# Patient Record
Sex: FEMALE | Race: WHITE | NOT HISPANIC OR LATINO
[De-identification: names, ages, dates, MRNs, and addresses within clinical notes are randomized per-mention and may not be internally consistent; named-entity substitution may affect disease eponyms.]

---

## 2017-10-27 ENCOUNTER — TRANSCRIPTION ENCOUNTER (OUTPATIENT)
Age: 32
End: 2017-10-27

## 2020-09-29 ENCOUNTER — APPOINTMENT (OUTPATIENT)
Dept: ANTEPARTUM | Facility: CLINIC | Age: 35
End: 2020-09-29
Payer: COMMERCIAL

## 2020-09-29 ENCOUNTER — LABORATORY RESULT (OUTPATIENT)
Age: 35
End: 2020-09-29

## 2020-09-29 PROCEDURE — 76801 OB US < 14 WKS SINGLE FETUS: CPT

## 2020-09-29 PROCEDURE — 76813 OB US NUCHAL MEAS 1 GEST: CPT

## 2020-10-27 ENCOUNTER — APPOINTMENT (OUTPATIENT)
Dept: ANTEPARTUM | Facility: CLINIC | Age: 35
End: 2020-10-27
Payer: COMMERCIAL

## 2020-10-27 PROCEDURE — 99213 OFFICE O/P EST LOW 20 MIN: CPT

## 2020-10-27 PROCEDURE — 99072 ADDL SUPL MATRL&STAF TM PHE: CPT

## 2020-10-27 PROCEDURE — 76811 OB US DETAILED SNGL FETUS: CPT

## 2020-10-27 PROCEDURE — 76817 TRANSVAGINAL US OBSTETRIC: CPT

## 2020-12-01 ENCOUNTER — APPOINTMENT (OUTPATIENT)
Dept: ANTEPARTUM | Facility: CLINIC | Age: 35
End: 2020-12-01
Payer: COMMERCIAL

## 2020-12-01 PROCEDURE — 76816 OB US FOLLOW-UP PER FETUS: CPT

## 2020-12-01 PROCEDURE — 76819 FETAL BIOPHYS PROFIL W/O NST: CPT

## 2020-12-29 ENCOUNTER — APPOINTMENT (OUTPATIENT)
Dept: ANTEPARTUM | Facility: CLINIC | Age: 35
End: 2020-12-29
Payer: COMMERCIAL

## 2020-12-29 PROCEDURE — 99072 ADDL SUPL MATRL&STAF TM PHE: CPT

## 2020-12-29 PROCEDURE — 76819 FETAL BIOPHYS PROFIL W/O NST: CPT

## 2020-12-29 PROCEDURE — 76816 OB US FOLLOW-UP PER FETUS: CPT

## 2021-01-26 ENCOUNTER — APPOINTMENT (OUTPATIENT)
Dept: ANTEPARTUM | Facility: CLINIC | Age: 36
End: 2021-01-26
Payer: COMMERCIAL

## 2021-01-26 PROCEDURE — 76816 OB US FOLLOW-UP PER FETUS: CPT

## 2021-01-26 PROCEDURE — 99072 ADDL SUPL MATRL&STAF TM PHE: CPT

## 2021-01-26 PROCEDURE — 76819 FETAL BIOPHYS PROFIL W/O NST: CPT

## 2021-03-02 ENCOUNTER — ASOB RESULT (OUTPATIENT)
Age: 36
End: 2021-03-02

## 2021-03-02 ENCOUNTER — APPOINTMENT (OUTPATIENT)
Dept: ANTEPARTUM | Facility: CLINIC | Age: 36
End: 2021-03-02
Payer: COMMERCIAL

## 2021-03-02 PROCEDURE — 76819 FETAL BIOPHYS PROFIL W/O NST: CPT

## 2021-03-02 PROCEDURE — 76816 OB US FOLLOW-UP PER FETUS: CPT

## 2021-03-02 PROCEDURE — 99072 ADDL SUPL MATRL&STAF TM PHE: CPT

## 2021-03-16 ENCOUNTER — ASOB RESULT (OUTPATIENT)
Age: 36
End: 2021-03-16

## 2021-03-16 ENCOUNTER — APPOINTMENT (OUTPATIENT)
Dept: ANTEPARTUM | Facility: CLINIC | Age: 36
End: 2021-03-16
Payer: COMMERCIAL

## 2021-03-16 PROCEDURE — 99072 ADDL SUPL MATRL&STAF TM PHE: CPT

## 2021-03-16 PROCEDURE — 76816 OB US FOLLOW-UP PER FETUS: CPT

## 2021-03-16 PROCEDURE — 76819 FETAL BIOPHYS PROFIL W/O NST: CPT

## 2021-04-08 ENCOUNTER — INPATIENT (INPATIENT)
Facility: HOSPITAL | Age: 36
LOS: 0 days | Discharge: HOME | End: 2021-04-09
Attending: OBSTETRICS & GYNECOLOGY | Admitting: OBSTETRICS & GYNECOLOGY
Payer: COMMERCIAL

## 2021-04-08 ENCOUNTER — RESULT REVIEW (OUTPATIENT)
Age: 36
End: 2021-04-08

## 2021-04-08 VITALS — WEIGHT: 158.73 LBS | HEIGHT: 62 IN

## 2021-04-08 LAB
ALBUMIN SERPL ELPH-MCNC: 3.7 G/DL — SIGNIFICANT CHANGE UP (ref 3.3–5)
ALP SERPL-CCNC: 149 U/L — HIGH (ref 40–120)
ALT FLD-CCNC: 11 U/L — SIGNIFICANT CHANGE UP (ref 10–45)
ANION GAP SERPL CALC-SCNC: 12 MMOL/L — SIGNIFICANT CHANGE UP (ref 5–17)
APTT BLD: 26.4 SEC — LOW (ref 27.5–35.5)
AST SERPL-CCNC: 20 U/L — SIGNIFICANT CHANGE UP (ref 10–40)
BASOPHILS # BLD AUTO: 0.07 K/UL — SIGNIFICANT CHANGE UP (ref 0–0.2)
BASOPHILS NFR BLD AUTO: 0.8 % — SIGNIFICANT CHANGE UP (ref 0–2)
BILIRUB SERPL-MCNC: 0.2 MG/DL — SIGNIFICANT CHANGE UP (ref 0.2–1.2)
BLD GP AB SCN SERPL QL: NEGATIVE — SIGNIFICANT CHANGE UP
BUN SERPL-MCNC: 10 MG/DL — SIGNIFICANT CHANGE UP (ref 7–23)
CALCIUM SERPL-MCNC: 9.3 MG/DL — SIGNIFICANT CHANGE UP (ref 8.4–10.5)
CHLORIDE SERPL-SCNC: 105 MMOL/L — SIGNIFICANT CHANGE UP (ref 96–108)
CO2 SERPL-SCNC: 21 MMOL/L — LOW (ref 22–31)
COVID-19 SPIKE DOMAIN AB INTERP: POSITIVE
COVID-19 SPIKE DOMAIN ANTIBODY RESULT: >250 U/ML — HIGH
CREAT ?TM UR-MCNC: 110 MG/DL — SIGNIFICANT CHANGE UP
CREAT SERPL-MCNC: 0.73 MG/DL — SIGNIFICANT CHANGE UP (ref 0.5–1.3)
EOSINOPHIL # BLD AUTO: 0.11 K/UL — SIGNIFICANT CHANGE UP (ref 0–0.5)
EOSINOPHIL NFR BLD AUTO: 1.2 % — SIGNIFICANT CHANGE UP (ref 0–6)
FIBRINOGEN PPP-MCNC: 391 MG/DL — SIGNIFICANT CHANGE UP (ref 258–438)
GLUCOSE SERPL-MCNC: 81 MG/DL — SIGNIFICANT CHANGE UP (ref 70–99)
HCT VFR BLD CALC: 33.9 % — LOW (ref 34.5–45)
HGB BLD-MCNC: 11.8 G/DL — SIGNIFICANT CHANGE UP (ref 11.5–15.5)
IMM GRANULOCYTES NFR BLD AUTO: 0.2 % — SIGNIFICANT CHANGE UP (ref 0–1.5)
INR BLD: 0.87 — LOW (ref 0.88–1.16)
LDH SERPL L TO P-CCNC: 152 U/L — SIGNIFICANT CHANGE UP (ref 50–242)
LYMPHOCYTES # BLD AUTO: 1.91 K/UL — SIGNIFICANT CHANGE UP (ref 1–3.3)
LYMPHOCYTES # BLD AUTO: 20.6 % — SIGNIFICANT CHANGE UP (ref 13–44)
MCHC RBC-ENTMCNC: 31.6 PG — SIGNIFICANT CHANGE UP (ref 27–34)
MCHC RBC-ENTMCNC: 34.8 GM/DL — SIGNIFICANT CHANGE UP (ref 32–36)
MCV RBC AUTO: 90.9 FL — SIGNIFICANT CHANGE UP (ref 80–100)
MONOCYTES # BLD AUTO: 0.76 K/UL — SIGNIFICANT CHANGE UP (ref 0–0.9)
MONOCYTES NFR BLD AUTO: 8.2 % — SIGNIFICANT CHANGE UP (ref 2–14)
NEUTROPHILS # BLD AUTO: 6.42 K/UL — SIGNIFICANT CHANGE UP (ref 1.8–7.4)
NEUTROPHILS NFR BLD AUTO: 69 % — SIGNIFICANT CHANGE UP (ref 43–77)
NRBC # BLD: 0 /100 WBCS — SIGNIFICANT CHANGE UP (ref 0–0)
PLATELET # BLD AUTO: 243 K/UL — SIGNIFICANT CHANGE UP (ref 150–400)
POTASSIUM SERPL-MCNC: 3.7 MMOL/L — SIGNIFICANT CHANGE UP (ref 3.5–5.3)
POTASSIUM SERPL-SCNC: 3.7 MMOL/L — SIGNIFICANT CHANGE UP (ref 3.5–5.3)
PROT ?TM UR-MCNC: 19 MG/DL — HIGH (ref 0–12)
PROT ?TM UR-MCNC: 19 MG/DL — HIGH (ref 0–12)
PROT SERPL-MCNC: 6.5 G/DL — SIGNIFICANT CHANGE UP (ref 6–8.3)
PROT/CREAT UR-RTO: 0.2 RATIO — SIGNIFICANT CHANGE UP (ref 0–0.2)
PROTHROM AB SERPL-ACNC: 10.5 SEC — LOW (ref 10.6–13.6)
RBC # BLD: 3.73 M/UL — LOW (ref 3.8–5.2)
RBC # FLD: 12.1 % — SIGNIFICANT CHANGE UP (ref 10.3–14.5)
RH IG SCN BLD-IMP: POSITIVE — SIGNIFICANT CHANGE UP
RH IG SCN BLD-IMP: POSITIVE — SIGNIFICANT CHANGE UP
SARS-COV-2 IGG+IGM SERPL QL IA: >250 U/ML — HIGH
SARS-COV-2 IGG+IGM SERPL QL IA: POSITIVE
SARS-COV-2 RNA SPEC QL NAA+PROBE: SIGNIFICANT CHANGE UP
SODIUM SERPL-SCNC: 138 MMOL/L — SIGNIFICANT CHANGE UP (ref 135–145)
T PALLIDUM AB TITR SER: NEGATIVE — SIGNIFICANT CHANGE UP
URATE SERPL-MCNC: 4.9 MG/DL — SIGNIFICANT CHANGE UP (ref 2.5–7)
WBC # BLD: 9.29 K/UL — SIGNIFICANT CHANGE UP (ref 3.8–10.5)
WBC # FLD AUTO: 9.29 K/UL — SIGNIFICANT CHANGE UP (ref 3.8–10.5)

## 2021-04-08 PROCEDURE — 88307 TISSUE EXAM BY PATHOLOGIST: CPT | Mod: 26

## 2021-04-08 RX ORDER — DIBUCAINE 1 %
1 OINTMENT (GRAM) RECTAL EVERY 6 HOURS
Refills: 0 | Status: DISCONTINUED | OUTPATIENT
Start: 2021-04-08 | End: 2021-04-09

## 2021-04-08 RX ORDER — DIPHENHYDRAMINE HCL 50 MG
25 CAPSULE ORAL EVERY 6 HOURS
Refills: 0 | Status: DISCONTINUED | OUTPATIENT
Start: 2021-04-08 | End: 2021-04-09

## 2021-04-08 RX ORDER — SODIUM CHLORIDE 9 MG/ML
1000 INJECTION, SOLUTION INTRAVENOUS
Refills: 0 | Status: DISCONTINUED | OUTPATIENT
Start: 2021-04-08 | End: 2021-04-08

## 2021-04-08 RX ORDER — HYDROCORTISONE 1 %
1 OINTMENT (GRAM) TOPICAL EVERY 6 HOURS
Refills: 0 | Status: DISCONTINUED | OUTPATIENT
Start: 2021-04-08 | End: 2021-04-09

## 2021-04-08 RX ORDER — SIMETHICONE 80 MG/1
80 TABLET, CHEWABLE ORAL EVERY 4 HOURS
Refills: 0 | Status: DISCONTINUED | OUTPATIENT
Start: 2021-04-08 | End: 2021-04-09

## 2021-04-08 RX ORDER — IBUPROFEN 200 MG
600 TABLET ORAL EVERY 6 HOURS
Refills: 0 | Status: COMPLETED | OUTPATIENT
Start: 2021-04-08 | End: 2022-03-07

## 2021-04-08 RX ORDER — KETOROLAC TROMETHAMINE 30 MG/ML
30 SYRINGE (ML) INJECTION ONCE
Refills: 0 | Status: DISCONTINUED | OUTPATIENT
Start: 2021-04-08 | End: 2021-04-08

## 2021-04-08 RX ORDER — MAGNESIUM HYDROXIDE 400 MG/1
30 TABLET, CHEWABLE ORAL
Refills: 0 | Status: DISCONTINUED | OUTPATIENT
Start: 2021-04-08 | End: 2021-04-09

## 2021-04-08 RX ORDER — OXYCODONE HYDROCHLORIDE 5 MG/1
5 TABLET ORAL
Refills: 0 | Status: DISCONTINUED | OUTPATIENT
Start: 2021-04-08 | End: 2021-04-09

## 2021-04-08 RX ORDER — TETANUS TOXOID, REDUCED DIPHTHERIA TOXOID AND ACELLULAR PERTUSSIS VACCINE, ADSORBED 5; 2.5; 8; 8; 2.5 [IU]/.5ML; [IU]/.5ML; UG/.5ML; UG/.5ML; UG/.5ML
0.5 SUSPENSION INTRAMUSCULAR ONCE
Refills: 0 | Status: DISCONTINUED | OUTPATIENT
Start: 2021-04-08 | End: 2021-04-09

## 2021-04-08 RX ORDER — ACETAMINOPHEN 500 MG
975 TABLET ORAL
Refills: 0 | Status: DISCONTINUED | OUTPATIENT
Start: 2021-04-08 | End: 2021-04-09

## 2021-04-08 RX ORDER — PRAMOXINE HYDROCHLORIDE 150 MG/15G
1 AEROSOL, FOAM RECTAL EVERY 4 HOURS
Refills: 0 | Status: DISCONTINUED | OUTPATIENT
Start: 2021-04-08 | End: 2021-04-09

## 2021-04-08 RX ORDER — LANOLIN
1 OINTMENT (GRAM) TOPICAL EVERY 6 HOURS
Refills: 0 | Status: DISCONTINUED | OUTPATIENT
Start: 2021-04-08 | End: 2021-04-09

## 2021-04-08 RX ORDER — FENTANYL/BUPIVACAINE/NS/PF 2MCG/ML-.1
250 PLASTIC BAG, INJECTION (ML) INJECTION
Refills: 0 | Status: DISCONTINUED | OUTPATIENT
Start: 2021-04-08 | End: 2021-04-08

## 2021-04-08 RX ORDER — OXYCODONE HYDROCHLORIDE 5 MG/1
5 TABLET ORAL ONCE
Refills: 0 | Status: DISCONTINUED | OUTPATIENT
Start: 2021-04-08 | End: 2021-04-09

## 2021-04-08 RX ORDER — CITRIC ACID/SODIUM CITRATE 300-500 MG
15 SOLUTION, ORAL ORAL EVERY 6 HOURS
Refills: 0 | Status: DISCONTINUED | OUTPATIENT
Start: 2021-04-08 | End: 2021-04-08

## 2021-04-08 RX ORDER — AER TRAVELER 0.5 G/1
1 SOLUTION RECTAL; TOPICAL EVERY 4 HOURS
Refills: 0 | Status: DISCONTINUED | OUTPATIENT
Start: 2021-04-08 | End: 2021-04-09

## 2021-04-08 RX ORDER — SODIUM CHLORIDE 9 MG/ML
3 INJECTION INTRAMUSCULAR; INTRAVENOUS; SUBCUTANEOUS EVERY 8 HOURS
Refills: 0 | Status: DISCONTINUED | OUTPATIENT
Start: 2021-04-08 | End: 2021-04-09

## 2021-04-08 RX ORDER — OXYTOCIN 10 UNIT/ML
333.33 VIAL (ML) INJECTION
Qty: 20 | Refills: 0 | Status: DISCONTINUED | OUTPATIENT
Start: 2021-04-08 | End: 2021-04-08

## 2021-04-08 RX ORDER — BENZOCAINE 10 %
1 GEL (GRAM) MUCOUS MEMBRANE EVERY 6 HOURS
Refills: 0 | Status: DISCONTINUED | OUTPATIENT
Start: 2021-04-08 | End: 2021-04-09

## 2021-04-08 RX ORDER — IBUPROFEN 200 MG
600 TABLET ORAL EVERY 6 HOURS
Refills: 0 | Status: DISCONTINUED | OUTPATIENT
Start: 2021-04-08 | End: 2021-04-09

## 2021-04-08 RX ORDER — OXYTOCIN 10 UNIT/ML
333.33 VIAL (ML) INJECTION
Qty: 20 | Refills: 0 | Status: DISCONTINUED | OUTPATIENT
Start: 2021-04-08 | End: 2021-04-09

## 2021-04-08 RX ADMIN — Medication 975 MILLIGRAM(S): at 21:10

## 2021-04-08 RX ADMIN — Medication 600 MILLIGRAM(S): at 19:26

## 2021-04-08 RX ADMIN — Medication 975 MILLIGRAM(S): at 20:33

## 2021-04-08 RX ADMIN — Medication 600 MILLIGRAM(S): at 18:38

## 2021-04-08 RX ADMIN — SODIUM CHLORIDE 125 MILLILITER(S): 9 INJECTION, SOLUTION INTRAVENOUS at 08:04

## 2021-04-08 RX ADMIN — SODIUM CHLORIDE 125 MILLILITER(S): 9 INJECTION, SOLUTION INTRAVENOUS at 08:24

## 2021-04-08 RX ADMIN — Medication 975 MILLIGRAM(S): at 14:26

## 2021-04-08 RX ADMIN — Medication 1000 MILLIUNIT(S)/MIN: at 14:31

## 2021-04-08 RX ADMIN — Medication 250 MILLILITER(S): at 11:20

## 2021-04-09 ENCOUNTER — TRANSCRIPTION ENCOUNTER (OUTPATIENT)
Age: 36
End: 2021-04-09

## 2021-04-09 VITALS
DIASTOLIC BLOOD PRESSURE: 66 MMHG | OXYGEN SATURATION: 98 % | TEMPERATURE: 98 F | SYSTOLIC BLOOD PRESSURE: 101 MMHG | RESPIRATION RATE: 17 BRPM | HEART RATE: 77 BPM

## 2021-04-09 PROBLEM — Z00.00 ENCOUNTER FOR PREVENTIVE HEALTH EXAMINATION: Status: ACTIVE | Noted: 2021-04-09

## 2021-04-09 PROCEDURE — 84550 ASSAY OF BLOOD/URIC ACID: CPT

## 2021-04-09 PROCEDURE — 85730 THROMBOPLASTIN TIME PARTIAL: CPT

## 2021-04-09 PROCEDURE — U0003: CPT

## 2021-04-09 PROCEDURE — 88307 TISSUE EXAM BY PATHOLOGIST: CPT

## 2021-04-09 PROCEDURE — 86901 BLOOD TYPING SEROLOGIC RH(D): CPT

## 2021-04-09 PROCEDURE — 82570 ASSAY OF URINE CREATININE: CPT

## 2021-04-09 PROCEDURE — 59050 FETAL MONITOR W/REPORT: CPT

## 2021-04-09 PROCEDURE — 85025 COMPLETE CBC W/AUTO DIFF WBC: CPT

## 2021-04-09 PROCEDURE — 86780 TREPONEMA PALLIDUM: CPT

## 2021-04-09 PROCEDURE — 84156 ASSAY OF PROTEIN URINE: CPT

## 2021-04-09 PROCEDURE — 86769 SARS-COV-2 COVID-19 ANTIBODY: CPT

## 2021-04-09 PROCEDURE — 85610 PROTHROMBIN TIME: CPT

## 2021-04-09 PROCEDURE — 80053 COMPREHEN METABOLIC PANEL: CPT

## 2021-04-09 PROCEDURE — 83615 LACTATE (LD) (LDH) ENZYME: CPT

## 2021-04-09 PROCEDURE — 86850 RBC ANTIBODY SCREEN: CPT

## 2021-04-09 PROCEDURE — 85384 FIBRINOGEN ACTIVITY: CPT

## 2021-04-09 PROCEDURE — U0005: CPT

## 2021-04-09 PROCEDURE — 86900 BLOOD TYPING SEROLOGIC ABO: CPT

## 2021-04-09 RX ORDER — BUPROPION HYDROCHLORIDE 150 MG/1
150 TABLET, EXTENDED RELEASE ORAL DAILY
Refills: 0 | Status: DISCONTINUED | OUTPATIENT
Start: 2021-04-09 | End: 2021-04-09

## 2021-04-09 RX ADMIN — Medication 600 MILLIGRAM(S): at 07:30

## 2021-04-09 RX ADMIN — Medication 975 MILLIGRAM(S): at 04:08

## 2021-04-09 RX ADMIN — Medication 600 MILLIGRAM(S): at 00:46

## 2021-04-09 RX ADMIN — Medication 600 MILLIGRAM(S): at 18:43

## 2021-04-09 RX ADMIN — Medication 975 MILLIGRAM(S): at 15:49

## 2021-04-09 RX ADMIN — Medication 975 MILLIGRAM(S): at 08:46

## 2021-04-09 RX ADMIN — Medication 600 MILLIGRAM(S): at 01:30

## 2021-04-09 RX ADMIN — Medication 600 MILLIGRAM(S): at 13:00

## 2021-04-09 RX ADMIN — Medication 600 MILLIGRAM(S): at 11:50

## 2021-04-09 RX ADMIN — Medication 1 TABLET(S): at 11:51

## 2021-04-09 RX ADMIN — Medication 975 MILLIGRAM(S): at 04:50

## 2021-04-09 RX ADMIN — Medication 600 MILLIGRAM(S): at 06:58

## 2021-04-09 RX ADMIN — BUPROPION HYDROCHLORIDE 150 MILLIGRAM(S): 150 TABLET, EXTENDED RELEASE ORAL at 07:49

## 2021-04-09 RX ADMIN — Medication 975 MILLIGRAM(S): at 09:58

## 2021-04-09 NOTE — DISCHARGE NOTE OB - CARE PLAN
Principal Discharge DX:	Vaginal delivery  Goal:	feel well!  Assessment and plan of treatment:	Vaginal delivery, meeting all postpartum milestones.  Follow up in 6 weeks.

## 2021-04-09 NOTE — PROGRESS NOTE ADULT - ASSESSMENT
Assessment/Plan    35y y.o. F now PPD#1 s/p . AfVSS. Patient is progressing toward all postpartum milestones. Pain is well-controlled on PO medications. Anticipate discharge today or tomorrow.    1. Pain  - Well-controlled on Motrin and Tylenol ATC    2. GI  - Tolerating regular diet without n/v  - Senna PRN    3.   - Voiding spontaneously without difficulty    4. DVT prophylaxis  - Encouraging ambulation    5. Dispo  - Anticipate dispo PPD#1        Bella Aldrich MD PGY1

## 2021-04-09 NOTE — PROGRESS NOTE ADULT - SUBJECTIVE AND OBJECTIVE BOX
Patient is a 35y y.o. F now PPD #1 s/p .    NAEO. Patient was evaluated at bedside this AM. Pain is well-controlled with PO pain medications. She has been ambulating without difficulty and voiding spontaneously. She is unsure if her vaginal bleeding has decreased.    Vital Signs Last 24 Hrs  T(C): 36.4 (2021 06:00), Max: 36.8 (2021 17:25)  T(F): 97.6 (2021 06:00), Max: 98.2 (2021 17:25)  HR: 78 (2021 06:00) (75 - 106)  BP: 106/67 (2021 06:00) (95/58 - 132/77)  BP(mean): 80 (2021 06:00) (70 - 80)  RR: 18 (2021 06:00) (17 - 20)  SpO2: 98% (2021 06:00) (97% - 98%)    Physical Exam  Gen: Well-appearing. No acute distress. Resting comfortably in bed.  Resp: Breathing comfortably on RA.  Abd: Soft, non-tender, non-distended. Uterus firm at umbilicus.  : Lochia WNL  Extremities: No calf tenderness.    Labs                          11.8   9.29  )-----------( 243      ( 2021 08:14 )             33.9         138  |  105  |  10  ----------------------------<  81  3.7   |  21<L>  |  0.73    Ca    9.3      2021 08:14    TPro  6.5  /  Alb  3.7  /  TBili  0.2  /  DBili  x   /  AST  20  /  ALT  11  /  AlkPhos  149<H>      PT/INR - ( 2021 08:17 )   PT: 10.5 sec;   INR: 0.87          PTT - ( 2021 08:17 )  PTT:26.4 sec        21 @ 07:01  -  21 @ 07:00  --------------------------------------------------------  IN: 0 mL / OUT: 1470 mL / NET: -1470 mL

## 2021-04-09 NOTE — DISCHARGE NOTE OB - CARE PROVIDER_API CALL
Jimena Morrison (DO; MS)  [facility]  [facility] [place] [address]  Phone: [phone]  Fax: [phone]  Follow Up Time:

## 2021-04-09 NOTE — DISCHARGE NOTE OB - PATIENT PORTAL LINK FT
You can access the FollowMyHealth Patient Portal offered by [facility] by registering at the following website: [email or web address]. By joining [facility]’s FollowMyHealth portal, you will also be able to view your health information using other applications (apps) compatible with our system.

## 2021-04-13 LAB — SURGICAL PATHOLOGY STUDY: SIGNIFICANT CHANGE UP

## 2021-04-14 DIAGNOSIS — Z3A.39 39 WEEKS GESTATION OF PREGNANCY: ICD-10-CM

## 2021-12-21 ENCOUNTER — TRANSCRIPTION ENCOUNTER (OUTPATIENT)
Age: 36
End: 2021-12-21

## 2021-12-21 ENCOUNTER — APPOINTMENT (OUTPATIENT)
Dept: ANTEPARTUM | Facility: CLINIC | Age: 36
End: 2021-12-21
Payer: COMMERCIAL

## 2021-12-21 ENCOUNTER — ASOB RESULT (OUTPATIENT)
Age: 36
End: 2021-12-21

## 2021-12-21 PROCEDURE — 76813 OB US NUCHAL MEAS 1 GEST: CPT

## 2021-12-21 PROCEDURE — 76801 OB US < 14 WKS SINGLE FETUS: CPT

## 2022-01-12 ENCOUNTER — APPOINTMENT (OUTPATIENT)
Dept: ANTEPARTUM | Facility: CLINIC | Age: 37
End: 2022-01-12
Payer: COMMERCIAL

## 2022-01-12 ENCOUNTER — ASOB RESULT (OUTPATIENT)
Age: 37
End: 2022-01-12

## 2022-01-12 PROCEDURE — 76811 OB US DETAILED SNGL FETUS: CPT

## 2022-01-12 PROCEDURE — 76817 TRANSVAGINAL US OBSTETRIC: CPT

## 2022-02-16 ENCOUNTER — APPOINTMENT (OUTPATIENT)
Dept: ANTEPARTUM | Facility: CLINIC | Age: 37
End: 2022-02-16
Payer: COMMERCIAL

## 2022-02-16 ENCOUNTER — ASOB RESULT (OUTPATIENT)
Age: 37
End: 2022-02-16

## 2022-02-16 PROCEDURE — 76816 OB US FOLLOW-UP PER FETUS: CPT

## 2022-02-18 ENCOUNTER — APPOINTMENT (OUTPATIENT)
Dept: PEDIATRIC CARDIOLOGY | Facility: CLINIC | Age: 37
End: 2022-02-18
Payer: COMMERCIAL

## 2022-02-18 PROCEDURE — 76825 ECHO EXAM OF FETAL HEART: CPT

## 2022-02-18 PROCEDURE — 76827 ECHO EXAM OF FETAL HEART: CPT

## 2022-02-18 PROCEDURE — 76820 UMBILICAL ARTERY ECHO: CPT

## 2022-02-18 PROCEDURE — 76821 MIDDLE CEREBRAL ARTERY ECHO: CPT

## 2022-02-18 PROCEDURE — 99203 OFFICE O/P NEW LOW 30 MIN: CPT

## 2022-02-18 PROCEDURE — 93325 DOPPLER ECHO COLOR FLOW MAPG: CPT | Mod: 59

## 2022-03-17 ENCOUNTER — ASOB RESULT (OUTPATIENT)
Age: 37
End: 2022-03-17

## 2022-03-17 ENCOUNTER — APPOINTMENT (OUTPATIENT)
Dept: ANTEPARTUM | Facility: CLINIC | Age: 37
End: 2022-03-17
Payer: COMMERCIAL

## 2022-03-17 PROCEDURE — 76815 OB US LIMITED FETUS(S): CPT

## 2022-03-25 ENCOUNTER — APPOINTMENT (OUTPATIENT)
Dept: PEDIATRIC CARDIOLOGY | Facility: CLINIC | Age: 37
End: 2022-03-25
Payer: COMMERCIAL

## 2022-03-25 PROCEDURE — 76826 ECHO EXAM OF FETAL HEART: CPT

## 2022-03-25 PROCEDURE — 99213 OFFICE O/P EST LOW 20 MIN: CPT

## 2022-03-25 PROCEDURE — 76821 MIDDLE CEREBRAL ARTERY ECHO: CPT

## 2022-03-25 PROCEDURE — 76828 ECHO EXAM OF FETAL HEART: CPT

## 2022-03-25 PROCEDURE — 76820 UMBILICAL ARTERY ECHO: CPT

## 2022-03-25 PROCEDURE — 93325 DOPPLER ECHO COLOR FLOW MAPG: CPT | Mod: 59

## 2022-04-28 ENCOUNTER — APPOINTMENT (OUTPATIENT)
Dept: ANTEPARTUM | Facility: CLINIC | Age: 37
End: 2022-04-28
Payer: COMMERCIAL

## 2022-04-28 ENCOUNTER — ASOB RESULT (OUTPATIENT)
Age: 37
End: 2022-04-28

## 2022-04-28 PROCEDURE — 76816 OB US FOLLOW-UP PER FETUS: CPT

## 2022-04-28 PROCEDURE — 76819 FETAL BIOPHYS PROFIL W/O NST: CPT

## 2022-05-06 ENCOUNTER — APPOINTMENT (OUTPATIENT)
Dept: PEDIATRIC CARDIOLOGY | Facility: CLINIC | Age: 37
End: 2022-05-06
Payer: COMMERCIAL

## 2022-05-06 PROCEDURE — 76820 UMBILICAL ARTERY ECHO: CPT

## 2022-05-06 PROCEDURE — 76826 ECHO EXAM OF FETAL HEART: CPT

## 2022-05-06 PROCEDURE — 99213 OFFICE O/P EST LOW 20 MIN: CPT

## 2022-05-06 PROCEDURE — 93325 DOPPLER ECHO COLOR FLOW MAPG: CPT | Mod: 59

## 2022-05-06 PROCEDURE — 76828 ECHO EXAM OF FETAL HEART: CPT

## 2022-05-06 PROCEDURE — 76821 MIDDLE CEREBRAL ARTERY ECHO: CPT

## 2022-06-02 ENCOUNTER — APPOINTMENT (OUTPATIENT)
Dept: ANTEPARTUM | Facility: CLINIC | Age: 37
End: 2022-06-02
Payer: COMMERCIAL

## 2022-06-02 ENCOUNTER — ASOB RESULT (OUTPATIENT)
Age: 37
End: 2022-06-02

## 2022-06-02 PROCEDURE — 76816 OB US FOLLOW-UP PER FETUS: CPT

## 2022-06-02 PROCEDURE — 76818 FETAL BIOPHYS PROFILE W/NST: CPT

## 2022-06-13 ENCOUNTER — ASOB RESULT (OUTPATIENT)
Age: 37
End: 2022-06-13

## 2022-06-13 ENCOUNTER — APPOINTMENT (OUTPATIENT)
Dept: ANTEPARTUM | Facility: CLINIC | Age: 37
End: 2022-06-13
Payer: COMMERCIAL

## 2022-06-13 PROCEDURE — 76819 FETAL BIOPHYS PROFIL W/O NST: CPT

## 2022-06-13 PROCEDURE — 76816 OB US FOLLOW-UP PER FETUS: CPT

## 2022-06-22 ENCOUNTER — INPATIENT (INPATIENT)
Facility: HOSPITAL | Age: 37
LOS: 1 days | Discharge: HOME | End: 2022-06-24
Attending: STUDENT IN AN ORGANIZED HEALTH CARE EDUCATION/TRAINING PROGRAM | Admitting: STUDENT IN AN ORGANIZED HEALTH CARE EDUCATION/TRAINING PROGRAM
Payer: COMMERCIAL

## 2022-06-22 ENCOUNTER — RESULT REVIEW (OUTPATIENT)
Age: 37
End: 2022-06-22

## 2022-06-22 VITALS — WEIGHT: 138.89 LBS | HEIGHT: 62 IN

## 2022-06-22 LAB
BASOPHILS # BLD AUTO: 0.05 K/UL — SIGNIFICANT CHANGE UP (ref 0–0.2)
BASOPHILS NFR BLD AUTO: 0.6 % — SIGNIFICANT CHANGE UP (ref 0–2)
BLD GP AB SCN SERPL QL: NEGATIVE — SIGNIFICANT CHANGE UP
EOSINOPHIL # BLD AUTO: 0.06 K/UL — SIGNIFICANT CHANGE UP (ref 0–0.5)
EOSINOPHIL NFR BLD AUTO: 0.8 % — SIGNIFICANT CHANGE UP (ref 0–6)
HCT VFR BLD CALC: 30.5 % — LOW (ref 34.5–45)
HGB BLD-MCNC: 10.7 G/DL — LOW (ref 11.5–15.5)
IMM GRANULOCYTES NFR BLD AUTO: 0.5 % — SIGNIFICANT CHANGE UP (ref 0–1.5)
LYMPHOCYTES # BLD AUTO: 1.95 K/UL — SIGNIFICANT CHANGE UP (ref 1–3.3)
LYMPHOCYTES # BLD AUTO: 24.9 % — SIGNIFICANT CHANGE UP (ref 13–44)
MCHC RBC-ENTMCNC: 31.1 PG — SIGNIFICANT CHANGE UP (ref 27–34)
MCHC RBC-ENTMCNC: 35.1 GM/DL — SIGNIFICANT CHANGE UP (ref 32–36)
MCV RBC AUTO: 88.7 FL — SIGNIFICANT CHANGE UP (ref 80–100)
MONOCYTES # BLD AUTO: 0.6 K/UL — SIGNIFICANT CHANGE UP (ref 0–0.9)
MONOCYTES NFR BLD AUTO: 7.7 % — SIGNIFICANT CHANGE UP (ref 2–14)
NEUTROPHILS # BLD AUTO: 5.13 K/UL — SIGNIFICANT CHANGE UP (ref 1.8–7.4)
NEUTROPHILS NFR BLD AUTO: 65.5 % — SIGNIFICANT CHANGE UP (ref 43–77)
NRBC # BLD: 0 /100 WBCS — SIGNIFICANT CHANGE UP (ref 0–0)
PLATELET # BLD AUTO: 252 K/UL — SIGNIFICANT CHANGE UP (ref 150–400)
RBC # BLD: 3.44 M/UL — LOW (ref 3.8–5.2)
RBC # FLD: 12.2 % — SIGNIFICANT CHANGE UP (ref 10.3–14.5)
RH IG SCN BLD-IMP: POSITIVE — SIGNIFICANT CHANGE UP
RH IG SCN BLD-IMP: POSITIVE — SIGNIFICANT CHANGE UP
WBC # BLD: 7.83 K/UL — SIGNIFICANT CHANGE UP (ref 3.8–10.5)
WBC # FLD AUTO: 7.83 K/UL — SIGNIFICANT CHANGE UP (ref 3.8–10.5)

## 2022-06-22 PROCEDURE — 88307 TISSUE EXAM BY PATHOLOGIST: CPT | Mod: 26

## 2022-06-22 RX ORDER — OXYTOCIN 10 UNIT/ML
333.33 VIAL (ML) INJECTION
Qty: 20 | Refills: 0 | Status: DISCONTINUED | OUTPATIENT
Start: 2022-06-22 | End: 2022-06-22

## 2022-06-22 RX ORDER — IBUPROFEN 200 MG
600 TABLET ORAL EVERY 6 HOURS
Refills: 0 | Status: DISCONTINUED | OUTPATIENT
Start: 2022-06-22 | End: 2022-06-24

## 2022-06-22 RX ORDER — FENTANYL/BUPIVACAINE/NS/PF 2MCG/ML-.1
250 PLASTIC BAG, INJECTION (ML) INJECTION
Refills: 0 | Status: DISCONTINUED | OUTPATIENT
Start: 2022-06-22 | End: 2022-06-22

## 2022-06-22 RX ORDER — OXYTOCIN 10 UNIT/ML
333.33 VIAL (ML) INJECTION
Qty: 20 | Refills: 0 | Status: DISCONTINUED | OUTPATIENT
Start: 2022-06-22 | End: 2022-06-24

## 2022-06-22 RX ORDER — SODIUM CHLORIDE 9 MG/ML
3 INJECTION INTRAMUSCULAR; INTRAVENOUS; SUBCUTANEOUS EVERY 8 HOURS
Refills: 0 | Status: DISCONTINUED | OUTPATIENT
Start: 2022-06-22 | End: 2022-06-24

## 2022-06-22 RX ORDER — SIMETHICONE 80 MG/1
80 TABLET, CHEWABLE ORAL EVERY 4 HOURS
Refills: 0 | Status: DISCONTINUED | OUTPATIENT
Start: 2022-06-22 | End: 2022-06-24

## 2022-06-22 RX ORDER — DIBUCAINE 1 %
1 OINTMENT (GRAM) RECTAL EVERY 6 HOURS
Refills: 0 | Status: DISCONTINUED | OUTPATIENT
Start: 2022-06-22 | End: 2022-06-24

## 2022-06-22 RX ORDER — CETIRIZINE HYDROCHLORIDE 10 MG/1
1 TABLET ORAL
Qty: 0 | Refills: 0 | DISCHARGE

## 2022-06-22 RX ORDER — SODIUM CHLORIDE 9 MG/ML
1000 INJECTION, SOLUTION INTRAVENOUS
Refills: 0 | Status: DISCONTINUED | OUTPATIENT
Start: 2022-06-22 | End: 2022-06-22

## 2022-06-22 RX ORDER — KETOROLAC TROMETHAMINE 30 MG/ML
30 SYRINGE (ML) INJECTION ONCE
Refills: 0 | Status: DISCONTINUED | OUTPATIENT
Start: 2022-06-22 | End: 2022-06-22

## 2022-06-22 RX ORDER — CITRIC ACID/SODIUM CITRATE 300-500 MG
15 SOLUTION, ORAL ORAL EVERY 6 HOURS
Refills: 0 | Status: DISCONTINUED | OUTPATIENT
Start: 2022-06-22 | End: 2022-06-22

## 2022-06-22 RX ORDER — LANOLIN
1 OINTMENT (GRAM) TOPICAL EVERY 6 HOURS
Refills: 0 | Status: DISCONTINUED | OUTPATIENT
Start: 2022-06-22 | End: 2022-06-24

## 2022-06-22 RX ORDER — TETANUS TOXOID, REDUCED DIPHTHERIA TOXOID AND ACELLULAR PERTUSSIS VACCINE, ADSORBED 5; 2.5; 8; 8; 2.5 [IU]/.5ML; [IU]/.5ML; UG/.5ML; UG/.5ML; UG/.5ML
0.5 SUSPENSION INTRAMUSCULAR ONCE
Refills: 0 | Status: DISCONTINUED | OUTPATIENT
Start: 2022-06-22 | End: 2022-06-24

## 2022-06-22 RX ORDER — ACETAMINOPHEN 500 MG
975 TABLET ORAL
Refills: 0 | Status: DISCONTINUED | OUTPATIENT
Start: 2022-06-22 | End: 2022-06-24

## 2022-06-22 RX ORDER — OXYCODONE HYDROCHLORIDE 5 MG/1
5 TABLET ORAL
Refills: 0 | Status: DISCONTINUED | OUTPATIENT
Start: 2022-06-22 | End: 2022-06-24

## 2022-06-22 RX ORDER — BUPROPION HYDROCHLORIDE 150 MG/1
150 TABLET, EXTENDED RELEASE ORAL DAILY
Refills: 0 | Status: DISCONTINUED | OUTPATIENT
Start: 2022-06-23 | End: 2022-06-24

## 2022-06-22 RX ORDER — PRAMOXINE HYDROCHLORIDE 150 MG/15G
1 AEROSOL, FOAM RECTAL EVERY 4 HOURS
Refills: 0 | Status: DISCONTINUED | OUTPATIENT
Start: 2022-06-22 | End: 2022-06-24

## 2022-06-22 RX ORDER — HYDROCORTISONE 1 %
1 OINTMENT (GRAM) TOPICAL EVERY 6 HOURS
Refills: 0 | Status: DISCONTINUED | OUTPATIENT
Start: 2022-06-22 | End: 2022-06-24

## 2022-06-22 RX ORDER — BENZOCAINE 10 %
1 GEL (GRAM) MUCOUS MEMBRANE EVERY 6 HOURS
Refills: 0 | Status: DISCONTINUED | OUTPATIENT
Start: 2022-06-22 | End: 2022-06-24

## 2022-06-22 RX ORDER — DIPHENHYDRAMINE HCL 50 MG
25 CAPSULE ORAL EVERY 6 HOURS
Refills: 0 | Status: DISCONTINUED | OUTPATIENT
Start: 2022-06-22 | End: 2022-06-24

## 2022-06-22 RX ORDER — AER TRAVELER 0.5 G/1
1 SOLUTION RECTAL; TOPICAL EVERY 4 HOURS
Refills: 0 | Status: DISCONTINUED | OUTPATIENT
Start: 2022-06-22 | End: 2022-06-24

## 2022-06-22 RX ORDER — OXYTOCIN 10 UNIT/ML
1 VIAL (ML) INJECTION
Qty: 30 | Refills: 0 | Status: DISCONTINUED | OUTPATIENT
Start: 2022-06-22 | End: 2022-06-22

## 2022-06-22 RX ORDER — MAGNESIUM HYDROXIDE 400 MG/1
30 TABLET, CHEWABLE ORAL
Refills: 0 | Status: DISCONTINUED | OUTPATIENT
Start: 2022-06-22 | End: 2022-06-24

## 2022-06-22 RX ORDER — ALBUTEROL 90 UG/1
2 AEROSOL, METERED ORAL EVERY 6 HOURS
Refills: 0 | Status: DISCONTINUED | OUTPATIENT
Start: 2022-06-22 | End: 2022-06-24

## 2022-06-22 RX ORDER — OXYTOCIN 10 UNIT/ML
2 VIAL (ML) INJECTION
Qty: 30 | Refills: 0 | Status: DISCONTINUED | OUTPATIENT
Start: 2022-06-22 | End: 2022-06-22

## 2022-06-22 RX ORDER — OXYCODONE HYDROCHLORIDE 5 MG/1
5 TABLET ORAL ONCE
Refills: 0 | Status: DISCONTINUED | OUTPATIENT
Start: 2022-06-22 | End: 2022-06-24

## 2022-06-22 RX ORDER — BUPROPION HYDROCHLORIDE 150 MG/1
1 TABLET, EXTENDED RELEASE ORAL
Qty: 0 | Refills: 0 | DISCHARGE

## 2022-06-22 RX ORDER — IBUPROFEN 200 MG
600 TABLET ORAL EVERY 6 HOURS
Refills: 0 | Status: COMPLETED | OUTPATIENT
Start: 2022-06-22 | End: 2023-05-21

## 2022-06-22 RX ADMIN — Medication 1000 MILLIUNIT(S)/MIN: at 18:57

## 2022-06-22 RX ADMIN — SODIUM CHLORIDE 125 MILLILITER(S): 9 INJECTION, SOLUTION INTRAVENOUS at 11:45

## 2022-06-22 RX ADMIN — Medication 250 MILLILITER(S): at 13:46

## 2022-06-22 RX ADMIN — Medication 30 MILLIGRAM(S): at 22:00

## 2022-06-22 RX ADMIN — Medication 30 MILLIGRAM(S): at 21:20

## 2022-06-22 NOTE — PATIENT PROFILE OB - FALL HARM RISK - UNIVERSAL INTERVENTIONS
Bed in lowest position, wheels locked, appropriate side rails in place/Call bell, personal items and telephone in reach/Instruct patient to call for assistance before getting out of bed or chair/Non-slip footwear when patient is out of bed/[place] to call system/Physically safe environment - no spills, clutter or unnecessary equipment/Purposeful Proactive Rounding/Room/bathroom lighting operational, light cord in reach

## 2022-06-23 ENCOUNTER — TRANSCRIPTION ENCOUNTER (OUTPATIENT)
Age: 37
End: 2022-06-23

## 2022-06-23 LAB
COVID-19 SPIKE DOMAIN AB INTERP: POSITIVE
COVID-19 SPIKE DOMAIN ANTIBODY RESULT: >250 U/ML — HIGH
SARS-COV-2 IGG+IGM SERPL QL IA: >250 U/ML — HIGH
SARS-COV-2 IGG+IGM SERPL QL IA: POSITIVE
T PALLIDUM AB TITR SER: NEGATIVE — SIGNIFICANT CHANGE UP

## 2022-06-23 RX ORDER — LANOLIN
1 OINTMENT (GRAM) TOPICAL
Qty: 0 | Refills: 0 | DISCHARGE
Start: 2022-06-23

## 2022-06-23 RX ORDER — IBUPROFEN 200 MG
1 TABLET ORAL
Qty: 0 | Refills: 0 | DISCHARGE
Start: 2022-06-23

## 2022-06-23 RX ORDER — ACETAMINOPHEN 500 MG
3 TABLET ORAL
Qty: 0 | Refills: 0 | DISCHARGE
Start: 2022-06-23

## 2022-06-23 RX ORDER — AER TRAVELER 0.5 G/1
1 SOLUTION RECTAL; TOPICAL
Qty: 0 | Refills: 0 | DISCHARGE
Start: 2022-06-23

## 2022-06-23 RX ORDER — ALBUTEROL 90 UG/1
0 AEROSOL, METERED ORAL
Qty: 0 | Refills: 0 | DISCHARGE

## 2022-06-23 RX ADMIN — Medication 600 MILLIGRAM(S): at 18:00

## 2022-06-23 RX ADMIN — Medication 975 MILLIGRAM(S): at 21:15

## 2022-06-23 RX ADMIN — BUPROPION HYDROCHLORIDE 150 MILLIGRAM(S): 150 TABLET, EXTENDED RELEASE ORAL at 08:36

## 2022-06-23 RX ADMIN — Medication 975 MILLIGRAM(S): at 08:05

## 2022-06-23 RX ADMIN — Medication 600 MILLIGRAM(S): at 12:18

## 2022-06-23 RX ADMIN — Medication 600 MILLIGRAM(S): at 11:18

## 2022-06-23 RX ADMIN — Medication 975 MILLIGRAM(S): at 09:05

## 2022-06-23 RX ADMIN — Medication 975 MILLIGRAM(S): at 14:08

## 2022-06-23 RX ADMIN — Medication 600 MILLIGRAM(S): at 04:00

## 2022-06-23 RX ADMIN — Medication 975 MILLIGRAM(S): at 21:45

## 2022-06-23 RX ADMIN — SODIUM CHLORIDE 3 MILLILITER(S): 9 INJECTION INTRAMUSCULAR; INTRAVENOUS; SUBCUTANEOUS at 05:00

## 2022-06-23 RX ADMIN — Medication 975 MILLIGRAM(S): at 15:00

## 2022-06-23 RX ADMIN — Medication 1 TABLET(S): at 11:18

## 2022-06-23 RX ADMIN — Medication 600 MILLIGRAM(S): at 03:22

## 2022-06-23 RX ADMIN — Medication 600 MILLIGRAM(S): at 17:10

## 2022-06-23 NOTE — DISCHARGE NOTE OB - HOSPITAL COURSE
Pt is s/p , uncomplicated. Patient is passing all postpartum milestones and is hemodynamically stable for discharge.

## 2022-06-23 NOTE — DISCHARGE NOTE OB - PATIENT PORTAL LINK FT
You can access the FollowMyHealth Patient Portal offered by Health system by registering at the following website: [email or web address]. By joining [facility]’s FollowMyHealth portal, you will also be able to view your health information using other applications (apps) compatible with our system.

## 2022-06-23 NOTE — DISCHARGE NOTE OB - NS MD DC FALL RISK RISK
For information on Fall & Injury Prevention, visit: [email or web address] OR  [email or web address] OR  https://www.cdc.gov/steadi/patient.html

## 2022-06-23 NOTE — DISCHARGE NOTE OB - CARE PROVIDER_API CALL
Tena Bhatia)  Obstetrics and Gynecology  [address] [place] [address]  Phone: [phone]  Fax: [phone]  Follow Up Time:

## 2022-06-23 NOTE — DISCHARGE NOTE OB - PLAN OF CARE
Patient is s/p  now postpartum day 1. Patient is passing all post partum milestones and is hemodynamically stable for discharge. Take Motrin 600mg every 6 hours and/or tylenol 650mg every 6 hours as needed for pain. Call your OB to schedule a follow up appointment in 6 weeks. Nothing per vagina until cleared by your OB - no intercourse, douching, tampons, etc.  Call your OB if you experience severe abdominal pain not improved by oral pain medications, heavy bright red vaginal bleeding saturating more than 1 pad per hour, or fever greater than 100.4F. Consider contraception options to be discussed with your OB.

## 2022-06-23 NOTE — DISCHARGE NOTE OB - CARE PLAN
Principal Discharge DX:	Postpartum state  Assessment and plan of treatment:	Patient is s/p  now postpartum day 1. Patient is passing all post partum milestones and is hemodynamically stable for discharge.   1 Principal Discharge DX:	Postpartum state  Assessment and plan of treatment:	Take Motrin 600mg every 6 hours and/or tylenol 650mg every 6 hours as needed for pain. Call your OB to schedule a follow up appointment in 6 weeks. Nothing per vagina until cleared by your OB - no intercourse, douching, tampons, etc.  Call your OB if you experience severe abdominal pain not improved by oral pain medications, heavy bright red vaginal bleeding saturating more than 1 pad per hour, or fever greater than 100.4F. Consider contraception options to be discussed with your OB.

## 2022-06-23 NOTE — DISCHARGE NOTE OB - MEDICATION SUMMARY - MEDICATIONS TO TAKE
I will START or STAY ON the medications listed below when I get home from the hospital:    acetaminophen 325 mg oral tablet  -- 3 tab(s) by mouth every 6 hours, As Needed  -- Indication: For Pain    ibuprofen 600 mg oral tablet  -- 1 tab(s) by mouth every 6 hours  -- Indication: For pain    ZyrTEC 5 mg oral tablet  -- 1 tab(s) by mouth once a day  -- Indication: For allergies    albuterol  -- 1 application inhaled 2 times a day, As Needed  -- Indication: For asthma    lanolin topical ointment  -- 1 application on skin every 6 hours, As needed, nipple soreness  -- Indication: For pain    witch hazel 50% topical pad  -- 1 application on skin every 4 hours, As needed, Perineal discomfort  -- Indication: For pain    Prenatal Multivitamins oral tablet  -- 1  by mouth once a day  -- Indication: For postpartum    Wellbutrin  mg/24 hours oral tablet, extended release  -- 1 tab(s) by mouth every 24 hours  -- Indication: For home

## 2022-06-24 VITALS
HEART RATE: 78 BPM | RESPIRATION RATE: 18 BRPM | OXYGEN SATURATION: 100 % | DIASTOLIC BLOOD PRESSURE: 78 MMHG | TEMPERATURE: 98 F | SYSTOLIC BLOOD PRESSURE: 126 MMHG

## 2022-06-24 PROCEDURE — 86850 RBC ANTIBODY SCREEN: CPT

## 2022-06-24 PROCEDURE — 88307 TISSUE EXAM BY PATHOLOGIST: CPT

## 2022-06-24 PROCEDURE — 86901 BLOOD TYPING SEROLOGIC RH(D): CPT

## 2022-06-24 PROCEDURE — 86780 TREPONEMA PALLIDUM: CPT

## 2022-06-24 PROCEDURE — 86900 BLOOD TYPING SEROLOGIC ABO: CPT

## 2022-06-24 PROCEDURE — 86769 SARS-COV-2 COVID-19 ANTIBODY: CPT

## 2022-06-24 PROCEDURE — 36415 COLL VENOUS BLD VENIPUNCTURE: CPT

## 2022-06-24 PROCEDURE — 85025 COMPLETE CBC W/AUTO DIFF WBC: CPT

## 2022-06-24 PROCEDURE — 97116 GAIT TRAINING THERAPY: CPT

## 2022-06-24 RX ADMIN — Medication 600 MILLIGRAM(S): at 06:08

## 2022-06-24 RX ADMIN — Medication 975 MILLIGRAM(S): at 14:45

## 2022-06-24 RX ADMIN — Medication 600 MILLIGRAM(S): at 00:41

## 2022-06-24 RX ADMIN — Medication 975 MILLIGRAM(S): at 09:34

## 2022-06-24 RX ADMIN — Medication 600 MILLIGRAM(S): at 12:45

## 2022-06-24 RX ADMIN — Medication 600 MILLIGRAM(S): at 12:06

## 2022-06-24 RX ADMIN — Medication 600 MILLIGRAM(S): at 00:11

## 2022-06-24 RX ADMIN — Medication 600 MILLIGRAM(S): at 06:38

## 2022-06-24 RX ADMIN — Medication 975 MILLIGRAM(S): at 15:10

## 2022-06-24 RX ADMIN — Medication 975 MILLIGRAM(S): at 10:34

## 2022-06-24 NOTE — PROGRESS NOTE ADULT - ASSESSMENT
A/P 37y s/p , PPD # 1 , stable, meeting postpartum milestones   - Pain: well controlled on opm  - GI: Tolerating regular diet  - : urinating without difficulty/pain  -DVT prophylaxis: ambulating frequently  -Dispo: PPD 2, unless otherwise specified  
A/P 37y s/p , PPD#2 , stable, meeting postpartum milestones   - Pain: well controlled on tylenol/motrin  - GI: Tolerating regular diet  - : urinating without difficulty/pain  - DVT prophylaxis: ambulating frequently  - Dispo: PPD 2, unless otherwise specified

## 2022-06-24 NOTE — PROGRESS NOTE ADULT - SUBJECTIVE AND OBJECTIVE BOX
Patient evaluated at bedside this morning, resting comfortable in bed, no acute events overnight.  She reports pain is well controlled with tylenol and motrin  She denies headache, dizziness, chest pain, palpitations, shortness of breath, nausea, vomiting, heavy vaginal bleeding or perineal discomfort. Reports decrease in amount of vaginal bleeding and denies clots.  She has been ambulating without assistance, voiding spontaneously, and is breastfeeding.   Tolerating food well, without nausea/vomit.  Passing flatus.     Physical Exam:  T(C): 36.6 (06-23-22 @ 05:49), Max: 37 (06-22-22 @ 21:50)  HR: 70 (06-23-22 @ 05:49) (70 - 79)  BP: 121/82 (06-23-22 @ 05:49) (121/82 - 123/84)  RR: 17 (06-23-22 @ 05:49) (16 - 17)  SpO2: 98% (06-23-22 @ 05:49) (98% - 98%)    GA: NAD, A&O x 3  Pulm: non distressed breathing  Abd: + BS, soft, nontender, nondistended, no rebound or guarding, uterus firm at midline and 2  fb below umbilicus  Perineum: normal lochia, intact, healing well, no hematoma  Extremities: no calf tenderness  Skin: no rashes                          10.7   7.83  )-----------( 252      ( 22 Jun 2022 11:32 )             30.5           acetaminophen     Tablet .. 975 milliGRAM(s) Oral <User Schedule>  ALBUTerol    90 MICROgram(s) HFA Inhaler 2 Puff(s) Inhalation every 6 hours PRN  benzocaine 20%/menthol 0.5% Spray 1 Spray(s) Topical every 6 hours PRN  buPROPion XL (24-Hour) . 150 milliGRAM(s) Oral daily  dibucaine 1% Ointment 1 Application(s) Topical every 6 hours PRN  diphenhydrAMINE 25 milliGRAM(s) Oral every 6 hours PRN  diphtheria/tetanus/pertussis (acellular) Vaccine (ADAcel) 0.5 milliLiter(s) IntraMuscular once  hydrocortisone 1% Cream 1 Application(s) Topical every 6 hours PRN  ibuprofen  Tablet. 600 milliGRAM(s) Oral every 6 hours  lanolin Ointment 1 Application(s) Topical every 6 hours PRN  magnesium hydroxide Suspension 30 milliLiter(s) Oral two times a day PRN  oxyCODONE    IR 5 milliGRAM(s) Oral every 3 hours PRN  oxyCODONE    IR 5 milliGRAM(s) Oral once PRN  oxytocin Infusion 333.333 milliUNIT(s)/Min IV Continuous <Continuous>  pramoxine 1%/zinc 5% Cream 1 Application(s) Topical every 4 hours PRN  prenatal multivitamin 1 Tablet(s) Oral daily  simethicone 80 milliGRAM(s) Chew every 4 hours PRN  sodium chloride 0.9% lock flush 3 milliLiter(s) IV Push every 8 hours  witch hazel Pads 1 Application(s) Topical every 4 hours PRN  
Patient seen and evaluated at bedside this morning, no acute events overnight.    She is lying comfortably in bed, reports pain is well controlled with tylenol and motrin. She has been ambulating without assistance, voiding spontaneously, and tolerating food.  Denies headache, dizziness, chest pain, palpitations, shortness of breath, nausea/vomiting, or heavy vaginal bleeding. Reports decrease in amount of vaginal bleeding and denies clots.    Physical Exam:  T(C): 36.6 (06-24-22 @ 06:13), Max: 36.6 (06-24-22 @ 06:13)  HR: 68 (06-24-22 @ 06:13) (64 - 68)  BP: 100/65 (06-24-22 @ 06:13) (100/65 - 116/63)  RR: 18 (06-24-22 @ 06:13) (18 - 18)  SpO2: 98% (06-24-22 @ 06:13) (98% - 99%)    GA: NAD, A&O x 3  CV: regular rate  Pulm: no inc WOB  Abd: soft, NT/ND, no rebound or guarding, uterus firm at midline and 2  fb below umbilicus  Perineum: normal lochia, intact, healing well  Extremities: mild pedal edema b/l, no calf tenderness                          10.7   7.83  )-----------( 252      ( 22 Jun 2022 11:32 )             30.5           acetaminophen     Tablet .. 975 milliGRAM(s) Oral <User Schedule>  ALBUTerol    90 MICROgram(s) HFA Inhaler 2 Puff(s) Inhalation every 6 hours PRN  benzocaine 20%/menthol 0.5% Spray 1 Spray(s) Topical every 6 hours PRN  buPROPion XL (24-Hour) . 150 milliGRAM(s) Oral daily  dibucaine 1% Ointment 1 Application(s) Topical every 6 hours PRN  diphenhydrAMINE 25 milliGRAM(s) Oral every 6 hours PRN  diphtheria/tetanus/pertussis (acellular) Vaccine (ADAcel) 0.5 milliLiter(s) IntraMuscular once  hydrocortisone 1% Cream 1 Application(s) Topical every 6 hours PRN  ibuprofen  Tablet. 600 milliGRAM(s) Oral every 6 hours  lanolin Ointment 1 Application(s) Topical every 6 hours PRN  magnesium hydroxide Suspension 30 milliLiter(s) Oral two times a day PRN  oxyCODONE    IR 5 milliGRAM(s) Oral every 3 hours PRN  oxyCODONE    IR 5 milliGRAM(s) Oral once PRN  oxytocin Infusion 333.333 milliUNIT(s)/Min IV Continuous <Continuous>  pramoxine 1%/zinc 5% Cream 1 Application(s) Topical every 4 hours PRN  prenatal multivitamin 1 Tablet(s) Oral daily  simethicone 80 milliGRAM(s) Chew every 4 hours PRN  sodium chloride 0.9% lock flush 3 milliLiter(s) IV Push every 8 hours  witch hazel Pads 1 Application(s) Topical every 4 hours PRN

## 2022-06-28 DIAGNOSIS — F41.9 ANXIETY DISORDER, UNSPECIFIED: ICD-10-CM

## 2022-06-28 DIAGNOSIS — K21.9 GASTRO-ESOPHAGEAL REFLUX DISEASE WITHOUT ESOPHAGITIS: ICD-10-CM

## 2022-06-28 DIAGNOSIS — J45.909 UNSPECIFIED ASTHMA, UNCOMPLICATED: ICD-10-CM

## 2022-06-28 DIAGNOSIS — Z34.83 ENCOUNTER FOR SUPERVISION OF OTHER NORMAL PREGNANCY, THIRD TRIMESTER: ICD-10-CM

## 2022-06-28 DIAGNOSIS — Z3A.39 39 WEEKS GESTATION OF PREGNANCY: ICD-10-CM

## 2022-06-29 ENCOUNTER — INPATIENT (INPATIENT)
Facility: HOSPITAL | Age: 37
LOS: 3 days | Discharge: HOME | DRG: 776 | End: 2022-07-03
Attending: OBSTETRICS & GYNECOLOGY | Admitting: OBSTETRICS & GYNECOLOGY
Payer: COMMERCIAL

## 2022-06-29 VITALS
RESPIRATION RATE: 17 BRPM | SYSTOLIC BLOOD PRESSURE: 172 MMHG | OXYGEN SATURATION: 98 % | WEIGHT: 126.99 LBS | HEIGHT: 62 IN | TEMPERATURE: 99 F | DIASTOLIC BLOOD PRESSURE: 105 MMHG | HEART RATE: 72 BPM

## 2022-06-29 LAB
ALBUMIN SERPL ELPH-MCNC: 3.6 G/DL — SIGNIFICANT CHANGE UP (ref 3.3–5)
ALP SERPL-CCNC: 195 U/L — HIGH (ref 40–120)
ALT FLD-CCNC: 38 U/L — SIGNIFICANT CHANGE UP (ref 10–45)
ANION GAP SERPL CALC-SCNC: 12 MMOL/L — SIGNIFICANT CHANGE UP (ref 5–17)
AST SERPL-CCNC: 24 U/L — SIGNIFICANT CHANGE UP (ref 10–40)
BASOPHILS # BLD AUTO: 0.05 K/UL — SIGNIFICANT CHANGE UP (ref 0–0.2)
BASOPHILS NFR BLD AUTO: 0.7 % — SIGNIFICANT CHANGE UP (ref 0–2)
BILIRUB SERPL-MCNC: 0.3 MG/DL — SIGNIFICANT CHANGE UP (ref 0.2–1.2)
BLD GP AB SCN SERPL QL: NEGATIVE — SIGNIFICANT CHANGE UP
BUN SERPL-MCNC: 14 MG/DL — SIGNIFICANT CHANGE UP (ref 7–23)
CALCIUM SERPL-MCNC: 8.9 MG/DL — SIGNIFICANT CHANGE UP (ref 8.4–10.5)
CHLORIDE SERPL-SCNC: 106 MMOL/L — SIGNIFICANT CHANGE UP (ref 96–108)
CO2 SERPL-SCNC: 23 MMOL/L — SIGNIFICANT CHANGE UP (ref 22–31)
CREAT SERPL-MCNC: 0.76 MG/DL — SIGNIFICANT CHANGE UP (ref 0.5–1.3)
EGFR: 103 ML/MIN/1.73M2 — SIGNIFICANT CHANGE UP
EOSINOPHIL # BLD AUTO: 0.09 K/UL — SIGNIFICANT CHANGE UP (ref 0–0.5)
EOSINOPHIL NFR BLD AUTO: 1.2 % — SIGNIFICANT CHANGE UP (ref 0–6)
GLUCOSE SERPL-MCNC: 95 MG/DL — SIGNIFICANT CHANGE UP (ref 70–99)
HCG SERPL-ACNC: 85 MIU/ML — HIGH
HCT VFR BLD CALC: 29.9 % — LOW (ref 34.5–45)
HCT VFR BLD CALC: 30.4 % — LOW (ref 34.5–45)
HGB BLD-MCNC: 10.3 G/DL — LOW (ref 11.5–15.5)
HGB BLD-MCNC: 10.5 G/DL — LOW (ref 11.5–15.5)
IMM GRANULOCYTES NFR BLD AUTO: 0.4 % — SIGNIFICANT CHANGE UP (ref 0–1.5)
LACTATE SERPL-SCNC: 0.5 MMOL/L — SIGNIFICANT CHANGE UP (ref 0.5–2)
LYMPHOCYTES # BLD AUTO: 2.03 K/UL — SIGNIFICANT CHANGE UP (ref 1–3.3)
LYMPHOCYTES # BLD AUTO: 26.5 % — SIGNIFICANT CHANGE UP (ref 13–44)
MCHC RBC-ENTMCNC: 31 PG — SIGNIFICANT CHANGE UP (ref 27–34)
MCHC RBC-ENTMCNC: 34.5 GM/DL — SIGNIFICANT CHANGE UP (ref 32–36)
MCV RBC AUTO: 89.7 FL — SIGNIFICANT CHANGE UP (ref 80–100)
MONOCYTES # BLD AUTO: 0.61 K/UL — SIGNIFICANT CHANGE UP (ref 0–0.9)
MONOCYTES NFR BLD AUTO: 8 % — SIGNIFICANT CHANGE UP (ref 2–14)
NEUTROPHILS # BLD AUTO: 4.86 K/UL — SIGNIFICANT CHANGE UP (ref 1.8–7.4)
NEUTROPHILS NFR BLD AUTO: 63.2 % — SIGNIFICANT CHANGE UP (ref 43–77)
NRBC # BLD: 0 /100 WBCS — SIGNIFICANT CHANGE UP (ref 0–0)
PLATELET # BLD AUTO: 356 K/UL — SIGNIFICANT CHANGE UP (ref 150–400)
POTASSIUM SERPL-MCNC: 3.7 MMOL/L — SIGNIFICANT CHANGE UP (ref 3.5–5.3)
POTASSIUM SERPL-SCNC: 3.7 MMOL/L — SIGNIFICANT CHANGE UP (ref 3.5–5.3)
PROT SERPL-MCNC: 6.3 G/DL — SIGNIFICANT CHANGE UP (ref 6–8.3)
RBC # BLD: 3.39 M/UL — LOW (ref 3.8–5.2)
RBC # FLD: 12.1 % — SIGNIFICANT CHANGE UP (ref 10.3–14.5)
RH IG SCN BLD-IMP: POSITIVE — SIGNIFICANT CHANGE UP
SARS-COV-2 RNA SPEC QL NAA+PROBE: NEGATIVE — SIGNIFICANT CHANGE UP
SODIUM SERPL-SCNC: 141 MMOL/L — SIGNIFICANT CHANGE UP (ref 135–145)
WBC # BLD: 7.67 K/UL — SIGNIFICANT CHANGE UP (ref 3.8–10.5)
WBC # FLD AUTO: 7.67 K/UL — SIGNIFICANT CHANGE UP (ref 3.8–10.5)

## 2022-06-29 PROCEDURE — 99285 EMERGENCY DEPT VISIT HI MDM: CPT

## 2022-06-29 RX ORDER — ACETAMINOPHEN 500 MG
650 TABLET ORAL ONCE
Refills: 0 | Status: COMPLETED | OUTPATIENT
Start: 2022-06-29 | End: 2022-06-29

## 2022-06-29 RX ORDER — LABETALOL HCL 100 MG
20 TABLET ORAL ONCE
Refills: 0 | Status: COMPLETED | OUTPATIENT
Start: 2022-06-29 | End: 2022-06-29

## 2022-06-29 RX ORDER — ACETAMINOPHEN 500 MG
650 TABLET ORAL EVERY 6 HOURS
Refills: 0 | Status: DISCONTINUED | OUTPATIENT
Start: 2022-06-29 | End: 2022-07-03

## 2022-06-29 RX ORDER — METOCLOPRAMIDE HCL 10 MG
10 TABLET ORAL ONCE
Refills: 0 | Status: COMPLETED | OUTPATIENT
Start: 2022-06-29 | End: 2022-06-29

## 2022-06-29 RX ORDER — MAGNESIUM SULFATE 500 MG/ML
4 VIAL (ML) INJECTION ONCE
Refills: 0 | Status: COMPLETED | OUTPATIENT
Start: 2022-06-29 | End: 2022-06-29

## 2022-06-29 RX ORDER — IPRATROPIUM/ALBUTEROL SULFATE 18-103MCG
3 AEROSOL WITH ADAPTER (GRAM) INHALATION EVERY 6 HOURS
Refills: 0 | Status: DISCONTINUED | OUTPATIENT
Start: 2022-06-29 | End: 2022-07-03

## 2022-06-29 RX ORDER — ALBUTEROL 90 UG/1
1 AEROSOL, METERED ORAL DAILY
Refills: 0 | Status: DISCONTINUED | OUTPATIENT
Start: 2022-06-29 | End: 2022-07-03

## 2022-06-29 RX ORDER — MAGNESIUM SULFATE 500 MG/ML
1.5 VIAL (ML) INJECTION
Qty: 40 | Refills: 0 | Status: DISCONTINUED | OUTPATIENT
Start: 2022-06-29 | End: 2022-06-30

## 2022-06-29 RX ADMIN — Medication 10 MILLIGRAM(S): at 19:04

## 2022-06-29 RX ADMIN — Medication 20 MILLIGRAM(S): at 17:46

## 2022-06-29 RX ADMIN — Medication 650 MILLIGRAM(S): at 17:38

## 2022-06-29 RX ADMIN — Medication 300 GRAM(S): at 18:34

## 2022-06-29 RX ADMIN — Medication 50 GM/HR: at 21:04

## 2022-06-29 NOTE — H&P ADULT - HISTORY OF PRESENT ILLNESS
36 yo  at PPD #7 (s/p  ) presents to ED complaining of unrelenting HA x9d, Elevated BP at home and chest tightness x2d. Pt states she has had a dull headache by her eyes for 9 days prior to delivery that was not relieved with tylenol.  Pt then noticed about 2d ago that she had some inconsistent chest tightness but denies SOB.  Pt today decided to take her BP at home and noticed it was elevated to 171/110.    Pt called her practice after noticing she had an elevated BP at home and came into the ED. Pt states during her labor course her BP was monitored and normal and she was ruled out for preeclampsia.  Pt denies scotoma, vision changes, RUQ pain, LE edema. Pt denies fever, chills, SOB, abdominal pain, nausea, vomiting.      OB/GYN Hx:  G1: 2019 VTOP D&E at 14w for trisomy. G2: 2021  FT no complications.  G3: 2021  FT no HTN during pregnancy or complications, does not breast feed. Denies all other gynhx  Last PAP smear: WNL    PMHx: anxiety  SHx: R cyst on her foot removed   Meds: Wellbutrin 150mg qd   Allergies: NKDA    Social hx: has 2 yo child at home that just had boswell lenae, 1w old . Partner.     PHYSICAL EXAM:   Vital Signs Last 24 Hrs  T(C): 36.7 (2022 17:54), Max: 37.1 (2022 17:03)  T(F): 98.1 (2022 17:54), Max: 98.7 (2022 17:03)  HR: 72 (2022 17:54) (72 - 72)  BP: 137/87 (2022 17:54) (137/87 - 172/105)  RR: 18 (2022 17:54) (17 - 18)  SpO2: 99% (2022 17:54) (98% - 99%)    **************************  Constitutional: Alert & Oriented x3, No acute distress  Respiratory: Clear to ausculation bilaterally; no wheezing, rhonchi, or crackles  Cardiovascular: regular rate and rhythm, no murmurs, or gallops  Gastrointestinal: soft, non tender, positive bowel sounds, no rebound or guarding   Pelvic exam: deferred  Extremities: no calf tenderness or swelling, no edema    LABS:                        10.5   7.67  )-----------( 356      ( 2022 17:52 )             30.4                   RADIOLOGY & ADDITIONAL STUDIES: 36 yo  at PPD #7 (s/p  ) presents to ED complaining of unrelenting HA x9d, Elevated BP at home and chest tightness x2d. Pt states she has had a dull headache by her eyes for 9 days prior to delivery that was not relieved with tylenol.  Pt then noticed about 2d ago that she had some inconsistent chest tightness but denies SOB.  Pt today decided to take her BP at home and noticed it was elevated to 171/110.    Pt called her practice after noticing she had an elevated BP at home and came into the ED. Pt states during her labor course her BP was monitored and normal and she was ruled out for preeclampsia.  Pt denies scotoma, vision changes, RUQ pain, LE edema. Pt denies fever, chills, SOB, abdominal pain, nausea, vomiting.      OB/GYN Hx:  G1: 2019 VTOP D&E at 14w for trisomy. G2: 2021  FT no complications.  G3: 2021  FT no HTN during pregnancy or complications, does not breast feed. Denies all other gynhx  Last PAP smear: WNL    PMHx: anxiety, exercise induced asthma   SHx: R cyst on her foot removed   Meds: Wellbutrin 150mg qd, Albuterol 4-5x  Allergies: NKDA    Social hx: has 2 yo child at home that just had andreia freeman, 1w old . Partner.     PHYSICAL EXAM:   Vital Signs Last 24 Hrs  T(C): 36.7 (2022 17:54), Max: 37.1 (2022 17:03)  T(F): 98.1 (2022 17:54), Max: 98.7 (2022 17:03)  HR: 72 (2022 17:54) (72 - 72)  BP: 137/87 (2022 17:54) (137/87 - 172/105)  RR: 18 (2022 17:54) (17 - 18)  SpO2: 99% (2022 17:54) (98% - 99%)    **************************  Constitutional: Alert & Oriented x3, No acute distress  Respiratory: Clear to ausculation bilaterally; no wheezing, rhonchi, or crackles  Cardiovascular: regular rate and rhythm, no murmurs, or gallops  Gastrointestinal: soft, non tender, positive bowel sounds, no rebound or guarding   Pelvic exam: deferred  Extremities: no calf tenderness or swelling, no edema    LABS:                        10.5   7.67  )-----------( 356      ( 2022 17:52 )             30.4                   RADIOLOGY & ADDITIONAL STUDIES:

## 2022-06-29 NOTE — H&P ADULT - ASSESSMENT
38 yo  at PPD #7 (s/p  ) presents complaining of unrelenting HA, chest tightness and elevated BP at home   -Continue to monitor BP every 15 minutes here, 137-172/.   -Please get full labs, CMP, uric acid, LDH, coags   -Please Start IV Magnesium (ordered) as soon as possible to prevent seizures   -Will perform Mg checks & continue to watch pt  -Pt ruled in for preeclampsia with HA and severe BP  -Upon arrival pt had 2 severe BP and pushed labetalol 20mg IV at 5:46PM, BP improved after push 137/85.   -Gave Tylenol 650mg po for headache at 17:38 no relief yet   -Pt coming up to Post partum floor for overnight admission   -Plan to start pt on po HTN medications   -Signed out to OB team Dr. Wilburn PGY4  -Seen with PGY1 Dr. Zelaya  -Dr Morrison on call and in agreement w/ plan for pt to be admitted to post partum unit for magnesium and monitoring    36 yo  at PPD #7 (s/p  ) presents complaining of unrelenting HA, chest tightness and elevated BP at home   -Continue to monitor BP every 15 minutes here, 137-172/.   -Please get full labs, CMP, uric acid, LDH, coags   -Please Start IV Magnesium (ordered) as soon as possible to prevent seizures   -Will perform Mg checks & continue to watch pt  -Pt ruled in for preeclampsia with HA and severe BP  -Upon arrival pt had 2 severe BP and pushed labetalol 20mg IV at 5:46PM, BP improved after push 137/85.   -Gave Tylenol 650mg po for headache at 17:38 no relief yet   -Pt coming up to Post partum floor for overnight admission   -Please order TTE & EKG   -Signed out to OB team Dr. Wilburn PGY4  -Seen with PGY1 Dr. Zelaya  -Dr Morrison on call and in agreement w/ plan for pt to be admitted to post partum unit for magnesium and monitoring    38 yo  at PPD #7 (s/p  ) presents complaining of unrelenting HA, chest tightness and elevated BP at home   -Continue to monitor BP every 15 minutes here, 137-172/.   -Please get full labs, CMP, uric acid, LDH, coags   -Magnesium started at 18:35  -Will perform Mg checks & continue to watch pt  -Pt ruled in for preeclampsia with HA and severe BP  -Upon arrival pt had 2 severe BP and pushed labetalol 20mg IV at 5:46PM, BP improved after push 137/85.   -Gave Tylenol 650mg po for headache at 17:38 no relief yet   -Pt coming up to Post partum floor for overnight admission   -Please order TTE & EKG   -Signed out to OB team Dr. Wilburn PGY4  -Seen with PGY1 Dr. Zelaya  -Dr Morrison on call and in agreement w/ plan for pt to be admitted to post partum unit for magnesium and monitoring

## 2022-06-29 NOTE — ED PROVIDER NOTE - CLINICAL SUMMARY MEDICAL DECISION MAKING FREE TEXT BOX
Eclampsia- HTN and headache in setting of post-partum  plan for BP control, load with magnesium  labs, OB consult, admit

## 2022-06-29 NOTE — ED PROVIDER NOTE - PROGRESS NOTE DETAILS
pt now says she has asthema which she did not disclose earlier to myself or OB team- if needs more anti-HTN, will give hydralazine not labetalol

## 2022-06-29 NOTE — ED ADULT NURSE NOTE - OBJECTIVE STATEMENT
Pt with pmx of vaginal delivery 7d ago presented to ED with c/o of h/a with hypertension. AOX4. Patient denies changes in LOC, blurred vision, chest pain, discomfort, shortness of breath, difficulty breathing and any form of distress not noted. Patient oriented to ED area. All needs attended. Pt on cardiac monitor. Rounding in progress. Fall risk precautions maintained.

## 2022-06-29 NOTE — ED PROVIDER NOTE - OBJECTIVE STATEMENT
37yoF , 7 days post-partum uncomplicated pregnancy and   here for 1 week intermittent headaches,  systolic at home today  no pre-eclampsia or gestational HTN, no other medical hx

## 2022-06-30 LAB
ALBUMIN SERPL ELPH-MCNC: 3.6 G/DL — SIGNIFICANT CHANGE UP (ref 3.3–5)
ALP SERPL-CCNC: 194 U/L — HIGH (ref 40–120)
ALT FLD-CCNC: 32 U/L — SIGNIFICANT CHANGE UP (ref 10–45)
ANION GAP SERPL CALC-SCNC: 9 MMOL/L — SIGNIFICANT CHANGE UP (ref 5–17)
AST SERPL-CCNC: 18 U/L — SIGNIFICANT CHANGE UP (ref 10–40)
BILIRUB SERPL-MCNC: 0.2 MG/DL — SIGNIFICANT CHANGE UP (ref 0.2–1.2)
BUN SERPL-MCNC: 9 MG/DL — SIGNIFICANT CHANGE UP (ref 7–23)
CALCIUM SERPL-MCNC: 6.6 MG/DL — LOW (ref 8.4–10.5)
CHLORIDE SERPL-SCNC: 105 MMOL/L — SIGNIFICANT CHANGE UP (ref 96–108)
CO2 SERPL-SCNC: 25 MMOL/L — SIGNIFICANT CHANGE UP (ref 22–31)
CREAT SERPL-MCNC: 0.7 MG/DL — SIGNIFICANT CHANGE UP (ref 0.5–1.3)
EGFR: 114 ML/MIN/1.73M2 — SIGNIFICANT CHANGE UP
GLUCOSE SERPL-MCNC: 113 MG/DL — HIGH (ref 70–99)
HCT VFR BLD CALC: 35.1 % — SIGNIFICANT CHANGE UP (ref 34.5–45)
HGB BLD-MCNC: 11.7 G/DL — SIGNIFICANT CHANGE UP (ref 11.5–15.5)
MAGNESIUM SERPL-MCNC: 4.7 MG/DL — HIGH (ref 1.6–2.6)
MAGNESIUM SERPL-MCNC: 6.4 MG/DL — HIGH (ref 1.6–2.6)
MAGNESIUM SERPL-MCNC: 6.8 MG/DL — HIGH (ref 1.6–2.6)
MCHC RBC-ENTMCNC: 29.8 PG — SIGNIFICANT CHANGE UP (ref 27–34)
MCHC RBC-ENTMCNC: 33.3 GM/DL — SIGNIFICANT CHANGE UP (ref 32–36)
MCV RBC AUTO: 89.3 FL — SIGNIFICANT CHANGE UP (ref 80–100)
NRBC # BLD: 0 /100 WBCS — SIGNIFICANT CHANGE UP (ref 0–0)
PLATELET # BLD AUTO: 365 K/UL — SIGNIFICANT CHANGE UP (ref 150–400)
POTASSIUM SERPL-MCNC: 4 MMOL/L — SIGNIFICANT CHANGE UP (ref 3.5–5.3)
POTASSIUM SERPL-SCNC: 4 MMOL/L — SIGNIFICANT CHANGE UP (ref 3.5–5.3)
PROT SERPL-MCNC: 6.3 G/DL — SIGNIFICANT CHANGE UP (ref 6–8.3)
RBC # BLD: 3.93 M/UL — SIGNIFICANT CHANGE UP (ref 3.8–5.2)
RBC # FLD: 12 % — SIGNIFICANT CHANGE UP (ref 10.3–14.5)
SODIUM SERPL-SCNC: 139 MMOL/L — SIGNIFICANT CHANGE UP (ref 135–145)
WBC # BLD: 6.05 K/UL — SIGNIFICANT CHANGE UP (ref 3.8–10.5)
WBC # FLD AUTO: 6.05 K/UL — SIGNIFICANT CHANGE UP (ref 3.8–10.5)

## 2022-06-30 RX ORDER — SODIUM CHLORIDE 9 MG/ML
1000 INJECTION, SOLUTION INTRAVENOUS
Refills: 0 | Status: DISCONTINUED | OUTPATIENT
Start: 2022-06-30 | End: 2022-07-01

## 2022-06-30 RX ORDER — METOCLOPRAMIDE HCL 10 MG
10 TABLET ORAL ONCE
Refills: 0 | Status: COMPLETED | OUTPATIENT
Start: 2022-06-30 | End: 2022-06-30

## 2022-06-30 RX ORDER — IBUPROFEN 200 MG
600 TABLET ORAL ONCE
Refills: 0 | Status: COMPLETED | OUTPATIENT
Start: 2022-06-30 | End: 2022-06-30

## 2022-06-30 RX ORDER — DIPHENHYDRAMINE HCL 50 MG
25 CAPSULE ORAL ONCE
Refills: 0 | Status: COMPLETED | OUTPATIENT
Start: 2022-06-30 | End: 2022-06-30

## 2022-06-30 RX ADMIN — SODIUM CHLORIDE 75 MILLILITER(S): 9 INJECTION, SOLUTION INTRAVENOUS at 06:00

## 2022-06-30 RX ADMIN — Medication 10 MILLIGRAM(S): at 09:00

## 2022-06-30 RX ADMIN — Medication 600 MILLIGRAM(S): at 06:00

## 2022-06-30 RX ADMIN — Medication 37.5 GM/HR: at 09:38

## 2022-06-30 RX ADMIN — Medication 25 MILLIGRAM(S): at 23:41

## 2022-06-30 RX ADMIN — Medication 600 MILLIGRAM(S): at 06:58

## 2022-06-30 NOTE — PATIENT PROFILE OB - FALL HARM RISK - UNIVERSAL INTERVENTIONS
Bed in lowest position, wheels locked, appropriate side rails in place/Call bell, personal items and telephone in reach/Instruct patient to call for assistance before getting out of bed or chair/Non-slip footwear when patient is out of bed/[place] to call system/Physically safe environment - no spills, clutter or unnecessary equipment/Purposeful Proactive Rounding/Room/bathroom lighting operational, light cord in reach

## 2022-06-30 NOTE — CHART NOTE - NSCHARTNOTEFT_GEN_A_CORE
Patient evaluated at bedside for clinical magnesium check. Serum magnesium drawn at 0200.  Reports very mild 2 out of 10 frontal HA, declines medications at this time. She denies visual disturbances/scotoma, and RUQ pain. Also denies chest pain, palpitations, SOB, inc WOB, abd/epigastric pain,  nausea/vomiting. Pain well controlled.     T(C): 36.5 (22 @ 21:30), Max: 37.1 (22 @ 17:03)  HR: 67 (22 @ 22:30) (60 - 72)  BP: 150/96 (22 @ 22:30) (126/82 - 172/105)  RR: 16 (22 @ 22:30) (14 - 18)  SpO2: 97% (22 @ 22:30) (97% - 99%)  Wt(kg): --  Daily Height in cm: 157.48 (2022 17:03)    Daily      @ 07:01  -  06 @ 01:38  --------------------------------------------------------  IN: 0 mL / OUT: 400 mL / NET: -400 mL      Gen: NAD, AAOx3  CV: RRR, no M/R/G, mild range BP  Pulm: CTAB, no R/R/W  Abd: soft, nontender, no rebound or guarding, no epigastric tenderness, liver nonpalpable, no RUQ?epigastric tenderness  : voiding  Ext: no LE edema, 1+ upper extremity reflexes b/l, no calf tenderness, no cnkle clonus, SCDs in place                          10.3   x     )-----------( x        ( 2022 19:18 )             29.9         141  |  106  |  14  ----------------------------<  95  3.7   |  23  |  0.76    Ca    8.9      2022 17:52  Mg     4.7         TPro  6.3  /  Alb  3.6  /  TBili  0.3  /  DBili  x   /  AST  24  /  ALT  38  /  AlkPhos  195<H>      acetaminophen     Tablet .. 650 milliGRAM(s) Oral every 6 hours PRN  ALBUTerol    90 MICROgram(s) HFA Inhaler 1 Puff(s) Inhalation daily  albuterol/ipratropium for Nebulization 3 milliLiter(s) Nebulizer every 6 hours  magnesium sulfate Infusion 2 Gm/Hr IV Continuous <Continuous>  prenatal multivitamin 1 Tablet(s) Oral daily      22 @ 07:  -  22 @ 01:38  --------------------------------------------------------  IN: 0 mL / OUT: 400 mL / NET: -400 mL            A&P:  37y  s/p , PPD#8 no readmitted for PEC w/ SF (HA, BP)  No complaints currently. No severe range BP.  Antihypertensives: none  Mg level is 4.7    - Continue IV Magnesium @2G/hr until  at 1830  - Continue to monitor blood pressures  - Follow up on Magnesium serum levels. Next Magnesium check @ 0630  - : strict Is and Os

## 2022-06-30 NOTE — PROGRESS NOTE ADULT - SUBJECTIVE AND OBJECTIVE BOX
Patient seen and evaluated at bedside this morning for AM rounds and clinical magnesium check.     She is lying comfortably in bed. Patient complaining of frontal headache radiating to right side of face and right neck. 6/10 pain, unrelieved by oral pain medications. Also reports increased pain with neck movement. She has been ambulating without assistance, voiding spontaneously, and tolerating food.  Denies dizziness, vision changes/scotoma, chest pain, palpitations, shortness of breath, nausea/vomiting, or heavy vaginal bleeding.     Physical Exam:  T(C): 36.5 (06-30-22 @ 05:00), Max: 36.5 (06-29-22 @ 21:30)  HR: 80 (06-30-22 @ 05:00) (60 - 80)  BP: 125/79 (06-30-22 @ 05:00) (111/79 - 150/96)  RR: 14 (06-30-22 @ 05:00) (14 - 17)  SpO2: 98% (06-30-22 @ 05:00) (97% - 99%)    GA: NAD, A&O x 3  CV: regular rate, normal to mild range blood pressures overnight. Heart RRR, no murmurs  Pulm: no inc WOB, CTA bilaterally  Abd: soft, NT/ND, no rebound or guarding, uterus firm at midline below umbilicus. No epigastric/RUQ tenderness.   Extremities: no pedal edema b/l, no calf tenderness. 1+ upper extremity reflexes.                           10.3   x     )-----------( x        ( 29 Jun 2022 19:18 )             29.9     06-29    141  |  106  |  14  ----------------------------<  95  3.7   |  23  |  0.76    Ca    8.9      29 Jun 2022 17:52  Mg     6.4     06-30    TPro  6.3  /  Alb  3.6  /  TBili  0.3  /  DBili  x   /  AST  24  /  ALT  38  /  AlkPhos  195<H>  06-29    acetaminophen     Tablet .. 650 milliGRAM(s) Oral every 6 hours PRN  ALBUTerol    90 MICROgram(s) HFA Inhaler 1 Puff(s) Inhalation daily  albuterol/ipratropium for Nebulization 3 milliLiter(s) Nebulizer every 6 hours  lactated ringers. 1000 milliLiter(s) IV Continuous <Continuous>  magnesium sulfate Infusion 2 Gm/Hr IV Continuous <Continuous>  prenatal multivitamin 1 Tablet(s) Oral daily

## 2022-06-30 NOTE — PROVIDER CONTACT NOTE (CHANGE IN STATUS NOTIFICATION) - SITUATION
pt is on magnesium as of yesterday afternoon, pts most previous magnesium serum level was 6.4 at 0630 and is now feeling newly dizzy and "heavy"

## 2022-07-01 RX ORDER — LABETALOL HCL 100 MG
200 TABLET ORAL EVERY 12 HOURS
Refills: 0 | Status: DISCONTINUED | OUTPATIENT
Start: 2022-07-01 | End: 2022-07-02

## 2022-07-01 RX ORDER — NIFEDIPINE 30 MG
30 TABLET, EXTENDED RELEASE 24 HR ORAL EVERY 24 HOURS
Refills: 0 | Status: DISCONTINUED | OUTPATIENT
Start: 2022-07-01 | End: 2022-07-03

## 2022-07-01 RX ORDER — HYDRALAZINE HCL 50 MG
10 TABLET ORAL ONCE
Refills: 0 | Status: COMPLETED | OUTPATIENT
Start: 2022-07-01 | End: 2022-07-01

## 2022-07-01 RX ORDER — BUPROPION HYDROCHLORIDE 150 MG/1
100 TABLET, EXTENDED RELEASE ORAL
Refills: 0 | Status: DISCONTINUED | OUTPATIENT
Start: 2022-07-01 | End: 2022-07-03

## 2022-07-01 RX ORDER — BUPROPION HYDROCHLORIDE 150 MG/1
150 TABLET, EXTENDED RELEASE ORAL EVERY 24 HOURS
Refills: 0 | Status: DISCONTINUED | OUTPATIENT
Start: 2022-07-02 | End: 2022-07-03

## 2022-07-01 RX ADMIN — Medication 0.5 MILLIGRAM(S): at 23:32

## 2022-07-01 RX ADMIN — Medication 650 MILLIGRAM(S): at 14:07

## 2022-07-01 RX ADMIN — Medication 650 MILLIGRAM(S): at 15:00

## 2022-07-01 RX ADMIN — Medication 650 MILLIGRAM(S): at 02:00

## 2022-07-01 RX ADMIN — Medication 650 MILLIGRAM(S): at 19:55

## 2022-07-01 RX ADMIN — Medication 10 MILLIGRAM(S): at 09:27

## 2022-07-01 RX ADMIN — Medication 650 MILLIGRAM(S): at 00:34

## 2022-07-01 RX ADMIN — Medication 1 TABLET(S): at 12:25

## 2022-07-01 RX ADMIN — Medication 0.5 MILLIGRAM(S): at 15:04

## 2022-07-01 RX ADMIN — Medication 30 MILLIGRAM(S): at 09:52

## 2022-07-01 RX ADMIN — Medication 200 MILLIGRAM(S): at 17:09

## 2022-07-01 NOTE — PROVIDER CONTACT NOTE (OTHER) - ASSESSMENT
Pt asymptomatic but does report feeling anxious and states, "I have white coat syndrome when I see the blood pressure machine."
Pt denies chest pain, SOB, dizziness.

## 2022-07-01 NOTE — PROGRESS NOTE ADULT - SUBJECTIVE AND OBJECTIVE BOX
Patient seen and evaluated at bedside this morning, no acute events overnight.    She is lying comfortably in bed, reports pain is well controlled with tylenol and motrin. Reports HA has completely resolved after taking benadryl last night. She has been ambulating without assistance, voiding spontaneously, and tolerating food.  Denies headache, vision changes/scotoma, dizziness, chest pain, palpitations, shortness of breath, nausea/vomiting, or heavy vaginal bleeding. Reports decrease in amount of vaginal bleeding and denies clots.    Physical Exam:  T(C): 36.7 (07-01-22 @ 05:32), Max: 36.8 (06-30-22 @ 23:00)  HR: 76 (07-01-22 @ 05:32) (76 - 90)  BP: 145/93 (07-01-22 @ 05:32) (142/90 - 150/90)  RR: 17 (07-01-22 @ 05:32) (17 - 18)  SpO2: 99% (07-01-22 @ 05:32) (97% - 99%)    GA: NAD, A&O x 3  CV: regular rate, mild range BP overnight  Pulm: no inc WOB  Abd: soft, NT/ND, no rebound or guarding, uterus firm at midline  Perineum: normal lochia, intact, healing well  Extremities: no pedal edema b/l, no calf tenderness                          11.7   6.05  )-----------( 365      ( 30 Jun 2022 12:11 )             35.1     06-30    139  |  105  |  9   ----------------------------<  113<H>  4.0   |  25  |  0.70    Ca    6.6<L>      30 Jun 2022 12:11  Mg     6.8     06-30    TPro  6.3  /  Alb  3.6  /  TBili  0.2  /  DBili  x   /  AST  18  /  ALT  32  /  AlkPhos  194<H>  06-30    acetaminophen     Tablet .. 650 milliGRAM(s) Oral every 6 hours PRN  ALBUTerol    90 MICROgram(s) HFA Inhaler 1 Puff(s) Inhalation daily  albuterol/ipratropium for Nebulization 3 milliLiter(s) Nebulizer every 6 hours  lactated ringers. 1000 milliLiter(s) IV Continuous <Continuous>  prenatal multivitamin 1 Tablet(s) Oral daily

## 2022-07-02 LAB
ALBUMIN SERPL ELPH-MCNC: 3.7 G/DL — SIGNIFICANT CHANGE UP (ref 3.3–5)
ALP SERPL-CCNC: 183 U/L — HIGH (ref 40–120)
ALT FLD-CCNC: 24 U/L — SIGNIFICANT CHANGE UP (ref 10–45)
ANION GAP SERPL CALC-SCNC: 10 MMOL/L — SIGNIFICANT CHANGE UP (ref 5–17)
AST SERPL-CCNC: 16 U/L — SIGNIFICANT CHANGE UP (ref 10–40)
BILIRUB SERPL-MCNC: 0.3 MG/DL — SIGNIFICANT CHANGE UP (ref 0.2–1.2)
BUN SERPL-MCNC: 10 MG/DL — SIGNIFICANT CHANGE UP (ref 7–23)
CALCIUM SERPL-MCNC: 8.7 MG/DL — SIGNIFICANT CHANGE UP (ref 8.4–10.5)
CHLORIDE SERPL-SCNC: 108 MMOL/L — SIGNIFICANT CHANGE UP (ref 96–108)
CO2 SERPL-SCNC: 23 MMOL/L — SIGNIFICANT CHANGE UP (ref 22–31)
CREAT SERPL-MCNC: 0.78 MG/DL — SIGNIFICANT CHANGE UP (ref 0.5–1.3)
EGFR: 100 ML/MIN/1.73M2 — SIGNIFICANT CHANGE UP
GLUCOSE SERPL-MCNC: 79 MG/DL — SIGNIFICANT CHANGE UP (ref 70–99)
HCT VFR BLD CALC: 38.6 % — SIGNIFICANT CHANGE UP (ref 34.5–45)
HGB BLD-MCNC: 12.6 G/DL — SIGNIFICANT CHANGE UP (ref 11.5–15.5)
MCHC RBC-ENTMCNC: 30.1 PG — SIGNIFICANT CHANGE UP (ref 27–34)
MCHC RBC-ENTMCNC: 32.6 GM/DL — SIGNIFICANT CHANGE UP (ref 32–36)
MCV RBC AUTO: 92.1 FL — SIGNIFICANT CHANGE UP (ref 80–100)
NRBC # BLD: 0 /100 WBCS — SIGNIFICANT CHANGE UP (ref 0–0)
PLATELET # BLD AUTO: 415 K/UL — HIGH (ref 150–400)
POTASSIUM SERPL-MCNC: 4.5 MMOL/L — SIGNIFICANT CHANGE UP (ref 3.5–5.3)
POTASSIUM SERPL-SCNC: 4.5 MMOL/L — SIGNIFICANT CHANGE UP (ref 3.5–5.3)
PROT SERPL-MCNC: 6.7 G/DL — SIGNIFICANT CHANGE UP (ref 6–8.3)
RBC # BLD: 4.19 M/UL — SIGNIFICANT CHANGE UP (ref 3.8–5.2)
RBC # FLD: 12.2 % — SIGNIFICANT CHANGE UP (ref 10.3–14.5)
SODIUM SERPL-SCNC: 141 MMOL/L — SIGNIFICANT CHANGE UP (ref 135–145)
WBC # BLD: 8.43 K/UL — SIGNIFICANT CHANGE UP (ref 3.8–10.5)
WBC # FLD AUTO: 8.43 K/UL — SIGNIFICANT CHANGE UP (ref 3.8–10.5)

## 2022-07-02 RX ORDER — IBUPROFEN 200 MG
600 TABLET ORAL EVERY 6 HOURS
Refills: 0 | Status: DISCONTINUED | OUTPATIENT
Start: 2022-07-02 | End: 2022-07-03

## 2022-07-02 RX ORDER — FLUTICASONE PROPIONATE 50 MCG
1 SPRAY, SUSPENSION NASAL
Refills: 0 | Status: DISCONTINUED | OUTPATIENT
Start: 2022-07-02 | End: 2022-07-03

## 2022-07-02 RX ORDER — METOCLOPRAMIDE HCL 10 MG
10 TABLET ORAL ONCE
Refills: 0 | Status: COMPLETED | OUTPATIENT
Start: 2022-07-02 | End: 2022-07-02

## 2022-07-02 RX ORDER — LABETALOL HCL 100 MG
200 TABLET ORAL EVERY 8 HOURS
Refills: 0 | Status: DISCONTINUED | OUTPATIENT
Start: 2022-07-02 | End: 2022-07-03

## 2022-07-02 RX ORDER — HYDRALAZINE HCL 50 MG
10 TABLET ORAL ONCE
Refills: 0 | Status: COMPLETED | OUTPATIENT
Start: 2022-07-02 | End: 2022-07-02

## 2022-07-02 RX ADMIN — Medication 650 MILLIGRAM(S): at 21:50

## 2022-07-02 RX ADMIN — Medication 650 MILLIGRAM(S): at 14:29

## 2022-07-02 RX ADMIN — Medication 600 MILLIGRAM(S): at 15:54

## 2022-07-02 RX ADMIN — Medication 10 MILLIGRAM(S): at 17:22

## 2022-07-02 RX ADMIN — Medication 30 MILLIGRAM(S): at 09:26

## 2022-07-02 RX ADMIN — Medication 200 MILLIGRAM(S): at 05:08

## 2022-07-02 RX ADMIN — Medication 1 TABLET(S): at 12:13

## 2022-07-02 RX ADMIN — Medication 0.5 MILLIGRAM(S): at 20:50

## 2022-07-02 RX ADMIN — Medication 10 MILLIGRAM(S): at 14:25

## 2022-07-02 RX ADMIN — Medication 650 MILLIGRAM(S): at 13:52

## 2022-07-02 RX ADMIN — BUPROPION HYDROCHLORIDE 150 MILLIGRAM(S): 150 TABLET, EXTENDED RELEASE ORAL at 08:25

## 2022-07-02 RX ADMIN — Medication 650 MILLIGRAM(S): at 06:04

## 2022-07-02 RX ADMIN — Medication 600 MILLIGRAM(S): at 16:40

## 2022-07-02 RX ADMIN — Medication 650 MILLIGRAM(S): at 20:50

## 2022-07-02 RX ADMIN — Medication 0.5 MILLIGRAM(S): at 10:44

## 2022-07-02 RX ADMIN — Medication 200 MILLIGRAM(S): at 23:14

## 2022-07-02 RX ADMIN — Medication 650 MILLIGRAM(S): at 05:35

## 2022-07-02 RX ADMIN — Medication 200 MILLIGRAM(S): at 14:15

## 2022-07-02 NOTE — CHART NOTE - NSCHARTNOTEFT_GEN_A_CORE
Patient examined at bedside for severe range BPs. Denies toxic symptoms including HA, vision changes, dizziness, SOB, RUQ/epigastric pain, n/v. BP was 177/105 with repeat 164/116. Hydralazine 10mg IV pushed at 1425. Labetalol 200 PO given as well. Labetalol dose changed to TID. Lungs clear to auscultation. Reflexes 2+. Will continue to monitor BPs.

## 2022-07-02 NOTE — PROGRESS NOTE ADULT - SUBJECTIVE AND OBJECTIVE BOX
Patient evaluated at bedside this morning, resting comfortable in bed, no acute events overnight. States that she feels much better after starting blood pressure medications yesterday and sleeping well last night. She believes her elevated pressure from this morning (155/103) was because she was startled from sleep before it was taken.     She reports pain is well controlled with tylenol and motrin.  She denies headache, dizziness, chest pain, palpitations, shortness of breath, nausea, vomiting, heavy vaginal bleeding or perineal discomfort. Reports decrease in amount of vaginal bleeding and denies clots.  She has been ambulating without assistance, voiding spontaneously.  Tolerating food well, without nausea/vomit.      Physical Exam:  T(C): 36.7 (07-02-22 @ 06:00), Max: 36.7 (07-01-22 @ 22:00)  HR: 75 (07-02-22 @ 06:00) (72 - 86)  BP: 138/87 (07-02-22 @ 06:00) (127/75 - 155/108)  RR: 17 (07-02-22 @ 06:00) (17 - 19)  SpO2: 98% (07-02-22 @ 06:00) (97% - 98%)    GA: NAD, A&O x 3  Abd: soft, nontender, nondistended, no rebound or guarding, uterus firm.  Extremities: no swelling or calf tenderness  Perineum: lochia less than menses, intact, healing well, no hematoma                          12.6   8.43  )-----------( 415      ( 02 Jul 2022 06:00 )             38.6     07-02    141  |  108  |  10  ----------------------------<  79  4.5   |  23  |  0.78    Ca    8.7      02 Jul 2022 06:00  Mg     6.8     06-30    TPro  6.7  /  Alb  3.7  /  TBili  0.3  /  DBili  x   /  AST  16  /  ALT  24  /  AlkPhos  183<H>  07-02    acetaminophen     Tablet .. 650 milliGRAM(s) Oral every 6 hours PRN  ALBUTerol    90 MICROgram(s) HFA Inhaler 1 Puff(s) Inhalation daily  albuterol/ipratropium for Nebulization 3 milliLiter(s) Nebulizer every 6 hours  buPROPion SR (12-Hour) 100 milliGRAM(s) Oral <User Schedule>  buPROPion XL (24-Hour) . 150 milliGRAM(s) Oral every 24 hours  labetalol 200 milliGRAM(s) Oral every 12 hours  LORazepam     Tablet 0.5 milliGRAM(s) Oral every 12 hours PRN  NIFEdipine XL 30 milliGRAM(s) Oral every 24 hours  prenatal multivitamin 1 Tablet(s) Oral daily

## 2022-07-02 NOTE — PROVIDER CONTACT NOTE (OTHER) - ACTION/TREATMENT ORDERED:
Repeat BP at 1405 was 164/116, HR 70. MD to administered IV push hydralazine and pt to also receive labetalol 200mg PO - see VS flowsheet for details and cycle of blood pressure readings.
OB made aware. As per OB No intervention needed at this time for the BP. She will come talk to the patient. Will continue to monitor.
MD ordered IV push hydralazine 10mg to be given, along with nifedipine XL 30mg PO. See VS flowsheet for details.

## 2022-07-02 NOTE — PROVIDER CONTACT NOTE (OTHER) - SITUATION
MD notified of pt's elevated BP at 1350: /109, HR 81
pt's /90, Pt is anxious. want to talk to the doctor.
MD informed that patient with two severe range BP within 15 minutes at 0900 and 0915. 0900: 165/100 and HR 88, 0915 /113.

## 2022-07-02 NOTE — PROGRESS NOTE ADULT - ATTENDING COMMENTS
Pt seen and examined at bedside. Patient currently feels well, tolerating medications. continue to cycle blood pressures and continue meds.
Pt   seen and examined at beside. Agree with above evaluation and assessment. Plan to continue to monitor BP and will modify medications as needed. If patient well controlled on medication and no toxic complaints plan for discharge this evening

## 2022-07-03 ENCOUNTER — TRANSCRIPTION ENCOUNTER (OUTPATIENT)
Age: 37
End: 2022-07-03

## 2022-07-03 VITALS
RESPIRATION RATE: 19 BRPM | SYSTOLIC BLOOD PRESSURE: 144 MMHG | OXYGEN SATURATION: 98 % | HEART RATE: 77 BPM | TEMPERATURE: 98 F | DIASTOLIC BLOOD PRESSURE: 94 MMHG

## 2022-07-03 LAB
ALBUMIN SERPL ELPH-MCNC: 3.8 G/DL — SIGNIFICANT CHANGE UP (ref 3.3–5)
ALP SERPL-CCNC: 162 U/L — HIGH (ref 40–120)
ALT FLD-CCNC: 20 U/L — SIGNIFICANT CHANGE UP (ref 10–45)
ANION GAP SERPL CALC-SCNC: 10 MMOL/L — SIGNIFICANT CHANGE UP (ref 5–17)
AST SERPL-CCNC: 15 U/L — SIGNIFICANT CHANGE UP (ref 10–40)
BILIRUB SERPL-MCNC: 0.4 MG/DL — SIGNIFICANT CHANGE UP (ref 0.2–1.2)
BUN SERPL-MCNC: 7 MG/DL — SIGNIFICANT CHANGE UP (ref 7–23)
CALCIUM SERPL-MCNC: 9 MG/DL — SIGNIFICANT CHANGE UP (ref 8.4–10.5)
CHLORIDE SERPL-SCNC: 105 MMOL/L — SIGNIFICANT CHANGE UP (ref 96–108)
CO2 SERPL-SCNC: 22 MMOL/L — SIGNIFICANT CHANGE UP (ref 22–31)
CREAT SERPL-MCNC: 0.76 MG/DL — SIGNIFICANT CHANGE UP (ref 0.5–1.3)
EGFR: 103 ML/MIN/1.73M2 — SIGNIFICANT CHANGE UP
GLUCOSE SERPL-MCNC: 82 MG/DL — SIGNIFICANT CHANGE UP (ref 70–99)
HCT VFR BLD CALC: 37.7 % — SIGNIFICANT CHANGE UP (ref 34.5–45)
HGB BLD-MCNC: 12.5 G/DL — SIGNIFICANT CHANGE UP (ref 11.5–15.5)
MCHC RBC-ENTMCNC: 30.3 PG — SIGNIFICANT CHANGE UP (ref 27–34)
MCHC RBC-ENTMCNC: 33.2 GM/DL — SIGNIFICANT CHANGE UP (ref 32–36)
MCV RBC AUTO: 91.3 FL — SIGNIFICANT CHANGE UP (ref 80–100)
NRBC # BLD: 0 /100 WBCS — SIGNIFICANT CHANGE UP (ref 0–0)
PLATELET # BLD AUTO: 397 K/UL — SIGNIFICANT CHANGE UP (ref 150–400)
POTASSIUM SERPL-MCNC: 4.6 MMOL/L — SIGNIFICANT CHANGE UP (ref 3.5–5.3)
POTASSIUM SERPL-SCNC: 4.6 MMOL/L — SIGNIFICANT CHANGE UP (ref 3.5–5.3)
PROT SERPL-MCNC: 6.7 G/DL — SIGNIFICANT CHANGE UP (ref 6–8.3)
RBC # BLD: 4.13 M/UL — SIGNIFICANT CHANGE UP (ref 3.8–5.2)
RBC # FLD: 12.4 % — SIGNIFICANT CHANGE UP (ref 10.3–14.5)
SODIUM SERPL-SCNC: 137 MMOL/L — SIGNIFICANT CHANGE UP (ref 135–145)
WBC # BLD: 8.17 K/UL — SIGNIFICANT CHANGE UP (ref 3.8–10.5)
WBC # FLD AUTO: 8.17 K/UL — SIGNIFICANT CHANGE UP (ref 3.8–10.5)

## 2022-07-03 PROCEDURE — 83735 ASSAY OF MAGNESIUM: CPT

## 2022-07-03 PROCEDURE — 87635 SARS-COV-2 COVID-19 AMP PRB: CPT

## 2022-07-03 PROCEDURE — 96374 THER/PROPH/DIAG INJ IV PUSH: CPT

## 2022-07-03 PROCEDURE — 36415 COLL VENOUS BLD VENIPUNCTURE: CPT

## 2022-07-03 PROCEDURE — 83605 ASSAY OF LACTIC ACID: CPT

## 2022-07-03 PROCEDURE — 86900 BLOOD TYPING SEROLOGIC ABO: CPT

## 2022-07-03 PROCEDURE — 86850 RBC ANTIBODY SCREEN: CPT

## 2022-07-03 PROCEDURE — 85025 COMPLETE CBC W/AUTO DIFF WBC: CPT

## 2022-07-03 PROCEDURE — 80053 COMPREHEN METABOLIC PANEL: CPT

## 2022-07-03 PROCEDURE — 99285 EMERGENCY DEPT VISIT HI MDM: CPT | Mod: 25

## 2022-07-03 PROCEDURE — 85014 HEMATOCRIT: CPT

## 2022-07-03 PROCEDURE — 85018 HEMOGLOBIN: CPT

## 2022-07-03 PROCEDURE — 85027 COMPLETE CBC AUTOMATED: CPT

## 2022-07-03 PROCEDURE — 84702 CHORIONIC GONADOTROPIN TEST: CPT

## 2022-07-03 PROCEDURE — 86901 BLOOD TYPING SEROLOGIC RH(D): CPT

## 2022-07-03 RX ORDER — NIFEDIPINE 30 MG
1 TABLET, EXTENDED RELEASE 24 HR ORAL
Qty: 0 | Refills: 0 | DISCHARGE
Start: 2022-07-03

## 2022-07-03 RX ORDER — LABETALOL HCL 100 MG
1 TABLET ORAL
Qty: 0 | Refills: 0 | DISCHARGE
Start: 2022-07-03

## 2022-07-03 RX ADMIN — Medication 600 MILLIGRAM(S): at 07:07

## 2022-07-03 RX ADMIN — Medication 600 MILLIGRAM(S): at 06:21

## 2022-07-03 RX ADMIN — Medication 200 MILLIGRAM(S): at 05:55

## 2022-07-03 RX ADMIN — Medication 30 MILLIGRAM(S): at 08:55

## 2022-07-03 NOTE — PROGRESS NOTE ADULT - SUBJECTIVE AND OBJECTIVE BOX
Patient evaluated at bedside this morning, resting comfortable in bed, no acute events overnight.  She denies toxic symptoms including headache, dizziness, chest pain, palpitations, shortness of breath, nausea, vomiting, RUQ/epigastric pain, edema. She reports having a headache after crying yesterday afternoon that resolved with sleep.   She has been ambulating without assistance, voiding spontaneously.  Tolerating food well, without nausea/vomit.      Physical Exam:  T(C): 36.7 (07-03-22 @ 06:00), Max: 36.8 (07-02-22 @ 22:50)  HR: 77 (07-03-22 @ 06:00) (73 - 85)  BP: 144/94 (07-03-22 @ 06:00) (100/56 - 144/94)  RR: 19 (07-03-22 @ 06:00) (18 - 19)  SpO2: 98% (07-03-22 @ 06:00) (96% - 98%)    GA: NAD, A&O x 3  Abd: + BS, soft, nontender, nondistended, no rebound or guarding, uterus firm.  Extremities: no swelling or calf tenderness  Perineum: lochia less than menses, intact, healing well, no hematoma                          12.6   8.43  )-----------( 415      ( 02 Jul 2022 06:00 )             38.6     07-02    141  |  108  |  10  ----------------------------<  79  4.5   |  23  |  0.78    Ca    8.7      02 Jul 2022 06:00    TPro  6.7  /  Alb  3.7  /  TBili  0.3  /  DBili  x   /  AST  16  /  ALT  24  /  AlkPhos  183<H>  07-02    acetaminophen     Tablet .. 650 milliGRAM(s) Oral every 6 hours PRN  ALBUTerol    90 MICROgram(s) HFA Inhaler 1 Puff(s) Inhalation daily  albuterol/ipratropium for Nebulization 3 milliLiter(s) Nebulizer every 6 hours  buPROPion SR (12-Hour) 100 milliGRAM(s) Oral <User Schedule>  buPROPion XL (24-Hour) . 150 milliGRAM(s) Oral every 24 hours  fluticasone propionate 50 MICROgram(s)/spray Nasal Spray 1 Spray(s) Both Nostrils two times a day  ibuprofen  Tablet. 600 milliGRAM(s) Oral every 6 hours PRN  labetalol 200 milliGRAM(s) Oral every 8 hours  LORazepam     Tablet 0.5 milliGRAM(s) Oral every 12 hours PRN  NIFEdipine XL 30 milliGRAM(s) Oral every 24 hours  prenatal multivitamin 1 Tablet(s) Oral daily   Patient evaluated at bedside this morning, resting comfortable in bed, no acute events overnight. She is feeling better today  She denies toxic symptoms including headache, dizziness, chest pain, palpitations, shortness of breath, nausea, vomiting, RUQ/epigastric pain, edema. She reports having a headache after crying yesterday afternoon that resolved with sleep overnight.   She has been ambulating without assistance, voiding spontaneously.  Tolerating food well, without nausea/vomit.      Physical Exam:  T(C): 36.7 (07-03-22 @ 06:00), Max: 36.8 (07-02-22 @ 22:50)  HR: 77 (07-03-22 @ 06:00) (73 - 85)  BP: 144/94 (07-03-22 @ 06:00) (100/56 - 144/94)  RR: 19 (07-03-22 @ 06:00) (18 - 19)  SpO2: 98% (07-03-22 @ 06:00) (96% - 98%)    GA: NAD, A&O x 3  Abd: soft, nontender, nondistended, no rebound or guarding, uterus firm.  Extremities: no swelling or calf tenderness                          12.6   8.43  )-----------( 415      ( 02 Jul 2022 06:00 )             38.6     07-02    141  |  108  |  10  ----------------------------<  79  4.5   |  23  |  0.78    Ca    8.7      02 Jul 2022 06:00    TPro  6.7  /  Alb  3.7  /  TBili  0.3  /  DBili  x   /  AST  16  /  ALT  24  /  AlkPhos  183<H>  07-02    acetaminophen     Tablet .. 650 milliGRAM(s) Oral every 6 hours PRN  ALBUTerol    90 MICROgram(s) HFA Inhaler 1 Puff(s) Inhalation daily  albuterol/ipratropium for Nebulization 3 milliLiter(s) Nebulizer every 6 hours  buPROPion SR (12-Hour) 100 milliGRAM(s) Oral <User Schedule>  buPROPion XL (24-Hour) . 150 milliGRAM(s) Oral every 24 hours  fluticasone propionate 50 MICROgram(s)/spray Nasal Spray 1 Spray(s) Both Nostrils two times a day  ibuprofen  Tablet. 600 milliGRAM(s) Oral every 6 hours PRN  labetalol 200 milliGRAM(s) Oral every 8 hours  LORazepam     Tablet 0.5 milliGRAM(s) Oral every 12 hours PRN  NIFEdipine XL 30 milliGRAM(s) Oral every 24 hours  prenatal multivitamin 1 Tablet(s) Oral daily

## 2022-07-03 NOTE — DISCHARGE NOTE OB - CARE PLAN
Principal Discharge DX:	Preeclampsia  Assessment and plan of treatment:	Monitor blood pressure twice daily with an electronic blood pressure cuff. Document blood pressures to review with obstetrician at follow-up appointment. Return to hospital for systolic blood pressure (top number) equal to or greater than 160 AND/OR diastolic blood pressure (bottom number)equal to or greater than 110 OR any of the following symptoms: changes in vision, headache not relieved with Tylenol, severe abdominal pain, vomiting, increased vaginal bleeding, chest pain or shortness of breath. Call obstetrician for persistent systolic blood pressure (top number) equal to or greater than 150 AND/OR diastolic blood pressure (bottom number) equal to or greater than 100. Follow-up with obstetrician in 1 week for blood pressure check.    Take your Labetalol 200 3 times per day and Nifedipine 30 once per day.   1

## 2022-07-03 NOTE — DISCHARGE NOTE OB - PATIENT PORTAL LINK FT
You can access the FollowMyHealth Patient Portal offered by [facility] by registering at the following website: [email or web address]. By joining [employer]’s FollowMyHealth portal, you will also be able to view your health information using other applications (apps) compatible with our system.

## 2022-07-03 NOTE — PROGRESS NOTE ADULT - ASSESSMENT
A&P:  37y  s/p , PP8/HD2, complicated by preeclampsia with severe features (headache, BP).  No severe range BP. Currently reports headache. Will give IV reglan and reassess.   Antihypertensives: none  Mg level is 6.4    - Continue IV Magnesium @2G/hr until   - Continue to monitor blood pressures  - Follow up on Magnesium serum levels. Next Magnesium check @ 12:30  - : strict Is and Os  - DVT PPx: frequent ambulation   - Hx asthma - albuterol PRN, avoid beta blockers,  - Hx anxiety and depression - continue Buproprion 150XL          
A/P 37y HD#5 s/p readmission for PEC w/ SF and PPD#11 s/b   - PEC w/ SF     - s/p Mg -     - Last required IV BP control 2030     - Currently on nifedipine 30mg QD, labetalol 200mg TID     - continue to monitor BPs   - GI: Tolerating regular diet  - : urinating without difficulty/pain  - DVT prophylaxis: ambulating frequently    
A/P 37y s/p  readmitted for PEC w/ SF (HA + BP), PPD#9/HD#3  - PEC w/ SF: s/p IV Mg, normotensive overnight, no toxic complaints, cont VSq4  - Pain: well controlled on tylenol/motrin  - GI: Tolerating regular diet  - : urinating without difficulty/pain  - DVT prophylaxis: ambulating frequently  - Anxiety/depression: cont bupropion 150  - Asthma: cont albuterol PRN    
A/P 37y s/p , PPD#10, HD#4, with preeclampsia with severe features (HA + BP) stable, meeting postpartum milestones     - PEC w/ SF: s/p IV mag, no toxic complaints, few mild range elevations yesterday and this morning, continue VSq4  - Pain: well controlled on tylenol/motrin  - GI: Tolerating regular diet  - : urinating without difficulty/pain  - DVT prophylaxis: ambulating frequently  - Anxiety/depression: continue buproprion 150  - Asthma: continue albuterol PRN

## 2022-07-03 NOTE — PROGRESS NOTE ADULT - REASON FOR ADMISSION
Preeclampsia in PP Day 7 pt

## 2022-07-03 NOTE — DISCHARGE NOTE OB - CARE PROVIDER_API CALL
Jimena Morrison (DO; MS)  [facility]  [address] [place] [address]  Phone: [phone]  Fax: [phone]  Follow Up Time:

## 2022-07-03 NOTE — DISCHARGE NOTE OB - HOSPITAL COURSE
A/P 37y HD#5 s/p readmission for PEC w/ SF and PPD#12 s/b   - PEC w/ SF     - s/p Mg -     - Last required IV BP control 2030     - Currently on nifedipine 30mg QD, labetalol 200mg TID     - continue to monitor BPs   - GI: Tolerating regular diet  - : urinating without difficulty/pain  - DVT prophylaxis: ambulating frequently

## 2022-07-03 NOTE — DISCHARGE NOTE OB - MEDICATION SUMMARY - MEDICATIONS TO TAKE
I will START or STAY ON the medications listed below when I get home from the hospital:    acetaminophen 325 mg oral tablet  -- 3 tab(s) by mouth every 6 hours, As Needed  -- Indication: For Pain    ibuprofen 600 mg oral tablet  -- 1 tab(s) by mouth every 6 hours  -- Indication: For Pain    ZyrTEC 5 mg oral tablet  -- 1 tab(s) by mouth once a day  -- Indication: For Home med    labetalol 200 mg oral tablet  -- 1 tab(s) by mouth every 8 hours  -- Indication: For HTN    albuterol  -- 1 application inhaled 2 times a day, As Needed  -- Indication: For Home med    NIFEdipine 30 mg oral tablet, extended release  -- 1 tab(s) by mouth every 24 hours  -- Indication: For HTN    lanolin topical ointment  -- 1 application on skin every 6 hours, As needed, nipple soreness  -- Indication: For Home med    witch hazel 50% topical pad  -- 1 application on skin every 4 hours, As needed, Perineal discomfort  -- Indication: For Home med    Prenatal Multivitamins oral tablet  -- 1  by mouth once a day  -- Indication: For Postpartum state    Wellbutrin  mg/24 hours oral tablet, extended release  -- 1 tab(s) by mouth every 24 hours  -- Indication: For Home med

## 2022-07-05 LAB — SURGICAL PATHOLOGY STUDY: SIGNIFICANT CHANGE UP

## 2022-07-09 DIAGNOSIS — J45.990 EXERCISE INDUCED BRONCHOSPASM: ICD-10-CM

## 2022-07-09 DIAGNOSIS — F41.8 OTHER SPECIFIED ANXIETY DISORDERS: ICD-10-CM

## 2022-07-09 DIAGNOSIS — Z28.09 IMMUNIZATION NOT CARRIED OUT BECAUSE OF OTHER CONTRAINDICATION: ICD-10-CM

## 2022-07-14 ENCOUNTER — APPOINTMENT (OUTPATIENT)
Dept: NEPHROLOGY | Facility: CLINIC | Age: 37
End: 2022-07-14

## 2022-07-14 VITALS — HEART RATE: 75 BPM | SYSTOLIC BLOOD PRESSURE: 174 MMHG | DIASTOLIC BLOOD PRESSURE: 109 MMHG

## 2022-07-14 PROCEDURE — 93784 AMBL BP MNTR W/SOFTWARE: CPT

## 2022-07-14 NOTE — PROCEDURE
[FreeTextEntry1] : gave birth ~3 weeks ago.  hx of postpartum PEC, Mg drip during recent hospital admission. also white coat HTN.  home BPs often elevated, then usually normalize after subsequent checks. \par \par Placed ABPM adult small cuff on left upper arm. Gave instructions for device function and proper fit.\par approx sleep period: varies ~9p - 6a\par current BP meds: labetalol 200mg BID

## 2022-07-15 ENCOUNTER — APPOINTMENT (OUTPATIENT)
Dept: NEPHROLOGY | Facility: CLINIC | Age: 37
End: 2022-07-15

## 2022-07-15 DIAGNOSIS — Z78.9 OTHER SPECIFIED HEALTH STATUS: ICD-10-CM

## 2022-07-15 PROCEDURE — 99205 OFFICE O/P NEW HI 60 MIN: CPT

## 2022-07-18 RX ORDER — LABETALOL HYDROCHLORIDE 200 MG/1
200 TABLET, FILM COATED ORAL
Qty: 60 | Refills: 0 | Status: DISCONTINUED | COMMUNITY
Start: 2022-07-02 | End: 2022-07-18

## 2022-07-18 NOTE — PHYSICAL EXAM
[General Appearance - Alert] : alert [General Appearance - In No Acute Distress] : in no acute distress [Jugular Venous Distention Increased] : there was no jugular-venous distention [Exaggerated Use Of Accessory Muscles For Inspiration] : no accessory muscle use [Auscultation Breath Sounds / Voice Sounds] : lungs were clear to auscultation bilaterally [Heart Rate And Rhythm] : heart rate was normal and rhythm regular [Heart Sounds] : normal S1 and S2 [Murmurs] : no murmurs [Edema] : there was no peripheral edema [No CVA Tenderness] : no ~M costovertebral angle tenderness [No Focal Deficits] : no focal deficits [Oriented To Time, Place, And Person] : oriented to person, place, and time

## 2022-07-18 NOTE — HISTORY OF PRESENT ILLNESS
[FreeTextEntry1] : 36yo  with anxiety, s/p   c/b post partum PEC req readmission and Mg (BP and headache, PCR 0.8g) here for blood pressure management\par \par OB Dr. Nasra Rahman\par \par Describes anxiety during hospitalization any time someone would take her blood pressure.\par She's had elevated blood pressures at urgent care visits or occasionally at office visits which would normalize on rechecks in the past. No dx of HTN.\par Discharged from hospital on 200mg TID Labetalol, 30mg nifedipine. Feeling dizzy, weak, light headed. Headaches. Stopped Nifedipine last week because of s/e, didn't change much how she's been feeling. \par \par 2021  sys on admission for delivery of her first son, 130/80s. 2 weeks post partum she had chest pain and headaches, went to ER, CT head negative but bp was elevated. \par \par

## 2022-07-18 NOTE — ASSESSMENT
[FreeTextEntry1] : 38yo  with anxiety, s/p   c/b post partum PEC req readmission and Mg (BP and headache) here for blood pressure management\par \par - reviewed 24hr ABPM, sustained hypertension, wants to switch from labetalol due to s/e of dizziness, headache, palpitations shortly after taking. Start low dose amlodipine 2.5mg daily, not breast feeding. To check BP at home less frequently to avoid anxiety related spikes in blood pressure. Discussed proper BP measuring technique. Her second and third BP checks in a row are always significantly lower than first due to anxiety. \par - reviewed inpatient records, urine 24hr protein 187mg, normal renal and liver function, normal platelets\par - to call if BPs are consistently >140/90 so we can increase amlodipine to 5mg daily\par - f/u in 3-4 weeks

## 2022-07-18 NOTE — REVIEW OF SYSTEMS
[As Noted in HPI] : as noted in HPI [Palpitations] : palpitations [Dizziness] : dizziness [Anxiety] : anxiety [Negative] : Heme/Lymph

## 2022-07-18 NOTE — CONSULT LETTER
[Dear  ___] : Dear  [unfilled], [Consult Letter:] : I had the pleasure of evaluating your patient, [unfilled]. [Please see my note below.] : Please see my note below. [Consult Closing:] : Thank you very much for allowing me to participate in the care of this patient.  If you have any questions, please do not hesitate to contact me. [Sincerely,] : Sincerely, [FreeTextEntry3] : Alexandra Pineda MD\par  of Medicine\par Division of Kidney Diseases and Hypertension\par [facility] \par [employer] [facility] Tel: [phone]\par Email: [email or web address] \par

## 2022-07-27 ENCOUNTER — TRANSCRIPTION ENCOUNTER (OUTPATIENT)
Age: 37
End: 2022-07-27

## 2022-08-17 ENCOUNTER — APPOINTMENT (OUTPATIENT)
Dept: NEPHROLOGY | Facility: CLINIC | Age: 37
End: 2022-08-17

## 2022-08-17 VITALS
DIASTOLIC BLOOD PRESSURE: 91 MMHG | OXYGEN SATURATION: 96 % | HEART RATE: 98 BPM | RESPIRATION RATE: 16 BRPM | SYSTOLIC BLOOD PRESSURE: 153 MMHG

## 2022-08-17 VITALS
HEIGHT: 62 IN | BODY MASS INDEX: 21.35 KG/M2 | OXYGEN SATURATION: 97 % | WEIGHT: 116 LBS | DIASTOLIC BLOOD PRESSURE: 96 MMHG | SYSTOLIC BLOOD PRESSURE: 159 MMHG | HEART RATE: 128 BPM

## 2022-08-17 PROCEDURE — 99214 OFFICE O/P EST MOD 30 MIN: CPT

## 2022-08-17 RX ORDER — LABETALOL HYDROCHLORIDE 100 MG/1
100 TABLET, FILM COATED ORAL
Qty: 60 | Refills: 0 | Status: DISCONTINUED | COMMUNITY
Start: 2022-07-07

## 2022-08-17 RX ORDER — BUSPIRONE HYDROCHLORIDE 5 MG/1
5 TABLET ORAL
Qty: 60 | Refills: 0 | Status: DISCONTINUED | COMMUNITY
Start: 2022-07-22

## 2022-08-17 RX ORDER — AZELASTINE HYDROCHLORIDE AND FLUTICASONE PROPIONATE 137; 50 UG/1; UG/1
137-50 SPRAY, METERED NASAL
Qty: 23 | Refills: 0 | Status: DISCONTINUED | COMMUNITY
Start: 2022-02-08

## 2022-08-17 RX ORDER — PROPRANOLOL HYDROCHLORIDE 10 MG/1
10 TABLET ORAL
Qty: 30 | Refills: 0 | Status: DISCONTINUED | COMMUNITY
Start: 2022-08-08

## 2022-08-17 RX ORDER — BUSPIRONE HYDROCHLORIDE 7.5 MG/1
7.5 TABLET ORAL
Qty: 60 | Refills: 0 | Status: DISCONTINUED | COMMUNITY
Start: 2022-07-17

## 2022-08-17 RX ORDER — LORAZEPAM 0.5 MG/1
0.5 TABLET ORAL
Qty: 30 | Refills: 0 | Status: DISCONTINUED | COMMUNITY
Start: 2022-07-05

## 2022-08-17 RX ORDER — AZELASTINE HYDROCHLORIDE 0.5 MG/ML
0.05 SOLUTION/ DROPS OPHTHALMIC
Qty: 6 | Refills: 0 | Status: DISCONTINUED | COMMUNITY
Start: 2022-02-08

## 2022-08-17 RX ORDER — LIFITEGRAST 50 MG/ML
5 SOLUTION/ DROPS OPHTHALMIC
Qty: 180 | Refills: 0 | Status: DISCONTINUED | COMMUNITY
Start: 2022-07-19

## 2022-08-17 RX ORDER — NIFEDIPINE 30 MG/1
30 TABLET, EXTENDED RELEASE ORAL
Qty: 30 | Refills: 0 | Status: DISCONTINUED | COMMUNITY
Start: 2022-07-02

## 2022-08-17 RX ORDER — LOTEPREDNOL ETABONATE 5 MG/ML
0.5 SUSPENSION/ DROPS OPHTHALMIC
Qty: 10 | Refills: 0 | Status: DISCONTINUED | COMMUNITY
Start: 2022-07-19

## 2022-08-17 RX ORDER — PROPRANOLOL HYDROCHLORIDE 10 MG/1
10 TABLET ORAL DAILY
Refills: 0 | Status: ACTIVE | COMMUNITY
Start: 2022-08-17

## 2022-08-18 LAB
APPEARANCE: CLEAR
BACTERIA: NEGATIVE
BILIRUBIN URINE: NEGATIVE
BLOOD URINE: NEGATIVE
COLOR: COLORLESS
CREAT SPEC-SCNC: 19 MG/DL
CREAT/PROT UR: NORMAL RATIO
GLUCOSE QUALITATIVE U: NEGATIVE
HYALINE CASTS: 0 /LPF
KETONES URINE: NEGATIVE
LEUKOCYTE ESTERASE URINE: NEGATIVE
MICROSCOPIC-UA: NORMAL
NITRITE URINE: NEGATIVE
PH URINE: 6
PROT UR-MCNC: <4 MG/DL
PROTEIN URINE: NEGATIVE
RED BLOOD CELLS URINE: 0 /HPF
SPECIFIC GRAVITY URINE: 1
SQUAMOUS EPITHELIAL CELLS: 0 /HPF
UROBILINOGEN URINE: NORMAL
WHITE BLOOD CELLS URINE: 0 /HPF

## 2022-08-20 ENCOUNTER — OFFICE (OUTPATIENT)
Dept: URBAN - METROPOLITAN AREA CLINIC 31 | Facility: CLINIC | Age: 37
Setting detail: OPHTHALMOLOGY
End: 2022-08-20
Payer: COMMERCIAL

## 2022-08-20 ENCOUNTER — RX ONLY (RX ONLY)
Age: 37
End: 2022-08-20

## 2022-08-20 DIAGNOSIS — H00.15: ICD-10-CM

## 2022-08-20 DIAGNOSIS — H53.10: ICD-10-CM

## 2022-08-20 PROCEDURE — 92004 COMPRE OPH EXAM NEW PT 1/>: CPT | Performed by: OPHTHALMOLOGY

## 2022-08-20 ASSESSMENT — TONOMETRY
OD_IOP_MMHG: 18
OS_IOP_MMHG: 18

## 2022-08-20 ASSESSMENT — CONFRONTATIONAL VISUAL FIELD TEST (CVF)
OD_FINDINGS: FULL
OS_FINDINGS: FULL

## 2022-08-20 ASSESSMENT — VISUAL ACUITY
OD_BCVA: 20/20
OS_BCVA: 20/20

## 2022-08-22 ENCOUNTER — LABORATORY RESULT (OUTPATIENT)
Age: 37
End: 2022-08-22

## 2022-08-22 LAB — BACTERIA UR CULT: NORMAL

## 2022-08-22 NOTE — ASSESSMENT
[FreeTextEntry1] : 38yo  with anxiety, s/p   c/b post partum PEC req readmission and Mg (BP and headache) here for blood pressure management\par \par - reviewed 24hr ABPM, sustained hypertension. BPs well controlled at home. Significant white coat htn. Ok to take propranolol

## 2022-08-22 NOTE — PHYSICAL EXAM
[General Appearance - Alert] : alert [General Appearance - In No Acute Distress] : in no acute distress [Jugular Venous Distention Increased] : there was no jugular-venous distention [Exaggerated Use Of Accessory Muscles For Inspiration] : no accessory muscle use [Auscultation Breath Sounds / Voice Sounds] : lungs were clear to auscultation bilaterally [Heart Rate And Rhythm] : heart rate was normal and rhythm regular [Heart Sounds] : normal S1 and S2 [Murmurs] : no murmurs [Edema] : there was no peripheral edema [No CVA Tenderness] : no ~M costovertebral angle tenderness [No Focal Deficits] : no focal deficits [Oriented To Time, Place, And Person] : oriented to person, place, and time [] : no rash

## 2022-08-22 NOTE — HISTORY OF PRESENT ILLNESS
[FreeTextEntry1] : 38yo  with anxiety, s/p   c/b post partum PEC req readmission and Mg (BP and headache, PCR 0.8g) here for blood pressure management\par \par OB Dr. Nasra Rahman\par \par Sees blue spots when she looks into the bibi. LE anterior shin tingling and bicep area tingling when she wakes up, improving since stopping amlodipine. Still seeing stars occasionally when she looks at walls\par Headaches persist, they started before amlodipine started but while she was tapering off labetalol and nifedipine. Had brain CT which was wnl. Since stopping amlodipine she's started having worse headaches, feeling of rushing up the back of her neck. \par Sinus congestion. \par BPs 130s/80-low 90s last few days off amlodipine.\par \par prn Propranalol for performance anxiety

## 2022-08-29 ENCOUNTER — EMERGENCY (EMERGENCY)
Facility: HOSPITAL | Age: 37
LOS: 1 days | Discharge: HOME | End: 2022-08-29
Attending: STUDENT IN AN ORGANIZED HEALTH CARE EDUCATION/TRAINING PROGRAM | Admitting: STUDENT IN AN ORGANIZED HEALTH CARE EDUCATION/TRAINING PROGRAM
Payer: COMMERCIAL

## 2022-08-29 VITALS
TEMPERATURE: 99 F | HEART RATE: 88 BPM | OXYGEN SATURATION: 98 % | HEIGHT: 62 IN | RESPIRATION RATE: 18 BRPM | WEIGHT: 111.99 LBS | SYSTOLIC BLOOD PRESSURE: 167 MMHG | DIASTOLIC BLOOD PRESSURE: 106 MMHG

## 2022-08-29 PROCEDURE — 99284 EMERGENCY DEPT VISIT MOD MDM: CPT

## 2022-08-29 PROCEDURE — 99282 EMERGENCY DEPT VISIT SF MDM: CPT

## 2022-08-29 NOTE — ED PROVIDER NOTE - PATIENT PORTAL LINK FT
You can access the FollowMyHealth Patient Portal offered by [facility] by registering at the following website: [email or web address]. By joining [employer]’s FollowMyHealth portal, you will also be able to view your health information using other applications (apps) compatible with our system.

## 2022-08-29 NOTE — ED PROVIDER NOTE - NS ED ROS FT
CONSTITUTIONAL:  No weight loss, fever, chills, weakness or fatigue.  HEENT:  Eyes:  No visual loss, blurred vision, double vision or yellow sclerae. Ears, Nose, Throat:  No hearing loss, sneezing, congestion, runny nose or sore throat.  CARDIOVASCULAR:  No chest pain, chest pressure or chest discomfort. No palpitations.  RESPIRATORY:  No shortness of breath, cough or sputum.  GASTROINTESTINAL:  No anorexia, nausea, vomiting or diarrhea. No abdominal pain or blood.  GENITOURINARY:  Denies hematuria, dysuria.   NEUROLOGICAL:   +Headache No dizziness, syncope, paralysis, ataxia, numbness or tingling in the extremities. No change in bowel or bladder control.  MUSCULOSKELETAL:  No muscle, back pain, joint pain or stiffness.  HEMATOLOGIC:  No anemia, bleeding or bruising.  LYMPHATICS:  No enlarged nodes.   PSYCHIATRIC:  No history of depression or anxiety.  ENDOCRINOLOGIC:  No reports of sweating, cold or heat intolerance. No polyuria or polydipsia.  ALLERGIES:  No history of asthma, hives, eczema or rhinitis.

## 2022-08-29 NOTE — ED PROVIDER NOTE - OBJECTIVE STATEMENT
36 yo F h/o pre-eclampsia in 6/22, white coat HTN followed by neurologist in [place] here for HA. Global tension like headache, gradualy worsening with nausea. Took tylenol and reglan yesterday. Only mild 1/10 HA now, no other complaints. Has MRI outpatient scheduled for tomorrow morning. Past two days c/o bilateral temporal fullness, ringing in ears with mild nausea. No vomiting, no fever or cough. No photophobia/phonobia. Took propanol prn this morning since she knew she was coming ot hospital. HA mostly resolved however called Neurologist office and they said if concerned go to ER for expedited MRI.

## 2022-08-29 NOTE — ED ADULT NURSE NOTE - OBJECTIVE STATEMENT
36 y/o female c/o headache and seeing flashing lights for "a while" Pt delivered two months ago and was treated for pre-eclampsia. Pt with hx HTN, takes propanolol PRN.

## 2022-08-29 NOTE — ED PROVIDER NOTE - PHYSICAL EXAMINATION
VITAL SIGNS: I have reviewed nursing notes and confirm.  CONSTITUTIONAL: Well appearing, in no acute distress.   SKIN:  warm and dry, no acute rash.   HEAD:  normocephalic, atraumatic.  EYES: EOM intact; conjunctiva and sclera clear.  ENT: No nasal discharge; airway clear.   NECK: Supple; non tender.  CARD: S1, S2 normal; no murmurs, gallops, or rubs. Regular rate and rhythm.   RESP:  Clear to auscultation b/l, no wheezes, rales or rhonchi.  ABD: Normal bowel sounds; soft; non-distended; non-tender; no guarding/ rebound.  EXT: Normal ROM. No clubbing, cyanosis or edema. 2+ pulses to b/l ue/le.  NEURO: Alert, oriented, grossly unremarkable, neg pronator drift, no dysmetria in UE or LE, 5/5 strength, neck supple, gait intact   PSYCH: Cooperative, mood and affect appropriate.

## 2022-08-29 NOTE — ED ADULT TRIAGE NOTE - CHIEF COMPLAINT QUOTE
Pt s/p induced vaginal delivery for preeclampsia 6/22 - on prn propanalol presents reporting worsening headaches and visual disturbances since friday. Pt reprots seeing flashes of light and squiggles in both visual fields, tingling tot he right side of face and right arm, burning to the back, tingling to bilateral legs. Pt states 'I have sever white coat hypertension' states she took her bp just before leaving home, result was 133/98. Pt denies any sudden changes in the past 24 hours. Pt had normal head ct 1 month ago, plans for MRI tomorrow, presents here today d/t worsening over the weekend.

## 2022-08-29 NOTE — ED ADULT NURSE NOTE - NS ED NOTE ABUSE SUSPICION NEGLECT YN
-ReL2y=6.5% . FS well controlled. Continue with ISS for now and continue to monitor FS closely.   -continue to hold metformin   -Diabetic peripheral neuropathy - c/w gabapentin and glycemic control No

## 2022-08-29 NOTE — ED PROVIDER NOTE - CLINICAL SUMMARY MEDICAL DECISION MAKING FREE TEXT BOX
38 yo F h/o pre-eclampsia in 6/22, white coat HTN followed by neurologist in [place] here for HA. Global tension like headache, gradualy worsening with nausea. Took tylenol and reglan yesterday. Only mild 1/10 HA now, no other complaints. Has MRI outpatient scheduled for tomorrow morning. Hypertensive here, otherwise non-focal neuro exam, gait intact, well appearing. most likely migraine HA with asymptomatic HTN. mild HA now. After shared decision making, pt prefers to get outpatient MRI tomorrow rather than ED MRI, given no indication for emergent MRI. Pt deferred pain meds. Counseled on NSAIDs, reglan at home. STable for discharge with strict return precautions. MRI tomorrow/Neuro fu.

## 2022-08-31 DIAGNOSIS — G43.909 MIGRAINE, UNSPECIFIED, NOT INTRACTABLE, WITHOUT STATUS MIGRAINOSUS: ICD-10-CM

## 2022-08-31 DIAGNOSIS — I10 ESSENTIAL (PRIMARY) HYPERTENSION: ICD-10-CM

## 2022-08-31 DIAGNOSIS — R51.9 HEADACHE, UNSPECIFIED: ICD-10-CM

## 2022-09-28 ENCOUNTER — APPOINTMENT (OUTPATIENT)
Dept: NEPHROLOGY | Facility: CLINIC | Age: 37
End: 2022-09-28

## 2022-09-28 VITALS
WEIGHT: 111 LBS | DIASTOLIC BLOOD PRESSURE: 98 MMHG | OXYGEN SATURATION: 98 % | HEART RATE: 93 BPM | BODY MASS INDEX: 20.43 KG/M2 | SYSTOLIC BLOOD PRESSURE: 161 MMHG | HEIGHT: 62 IN

## 2022-09-28 DIAGNOSIS — I73.00 RAYNAUD'S SYNDROME W/OUT GANGRENE: ICD-10-CM

## 2022-09-28 DIAGNOSIS — F41.9 ANXIETY DISORDER, UNSPECIFIED: ICD-10-CM

## 2022-09-28 PROCEDURE — 99214 OFFICE O/P EST MOD 30 MIN: CPT

## 2022-09-28 RX ORDER — FLUOXETINE HYDROCHLORIDE 10 MG/1
10 CAPSULE ORAL
Qty: 30 | Refills: 0 | Status: DISCONTINUED | COMMUNITY
Start: 2022-07-05 | End: 2022-09-28

## 2022-09-28 RX ORDER — AMLODIPINE BESYLATE 2.5 MG/1
2.5 TABLET ORAL
Qty: 90 | Refills: 3 | Status: DISCONTINUED | COMMUNITY
Start: 2022-07-15 | End: 2022-09-28

## 2022-09-28 NOTE — PHYSICAL EXAM
[General Appearance - Alert] : alert [General Appearance - In No Acute Distress] : in no acute distress [Jugular Venous Distention Increased] : there was no jugular-venous distention [Exaggerated Use Of Accessory Muscles For Inspiration] : no accessory muscle use [Auscultation Breath Sounds / Voice Sounds] : lungs were clear to auscultation bilaterally [Heart Rate And Rhythm] : heart rate was normal and rhythm regular [Heart Sounds] : normal S1 and S2 [Murmurs] : no murmurs [Edema] : there was no peripheral edema [No CVA Tenderness] : no ~M costovertebral angle tenderness [] : no rash [No Focal Deficits] : no focal deficits [Oriented To Time, Place, And Person] : oriented to person, place, and time

## 2022-09-29 ENCOUNTER — APPOINTMENT (OUTPATIENT)
Dept: RHEUMATOLOGY | Facility: CLINIC | Age: 37
End: 2022-09-29

## 2022-09-29 ENCOUNTER — LABORATORY RESULT (OUTPATIENT)
Age: 37
End: 2022-09-29

## 2022-09-29 VITALS
WEIGHT: 112 LBS | RESPIRATION RATE: 15 BRPM | DIASTOLIC BLOOD PRESSURE: 113 MMHG | BODY MASS INDEX: 20.61 KG/M2 | SYSTOLIC BLOOD PRESSURE: 172 MMHG | HEART RATE: 79 BPM | HEIGHT: 62 IN | TEMPERATURE: 98.7 F | OXYGEN SATURATION: 98 %

## 2022-09-29 DIAGNOSIS — R21 RASH AND OTHER NONSPECIFIC SKIN ERUPTION: ICD-10-CM

## 2022-09-29 DIAGNOSIS — Z87.898 PERSONAL HISTORY OF OTHER SPECIFIED CONDITIONS: ICD-10-CM

## 2022-09-29 DIAGNOSIS — J32.9 CHRONIC SINUSITIS, UNSPECIFIED: ICD-10-CM

## 2022-09-29 DIAGNOSIS — M25.50 PAIN IN UNSPECIFIED JOINT: ICD-10-CM

## 2022-09-29 PROCEDURE — 99205 OFFICE O/P NEW HI 60 MIN: CPT

## 2022-09-29 NOTE — HISTORY OF PRESENT ILLNESS
[FreeTextEntry1] : Referring physician: Dr. Alexandra Pineda\par \par 37F here for consultation. \par \par Pt reports delivery 6.22, after this, problems started. \par \par Pt was dx w prepartum preeclampsia, and white coat HTN. Currently not on any bp medication. \par \par Pt says she was having bad reactions to bp medications like amlodipine. \par \par Pt then developed severe h/a, light headedness. Pt also gets stiff neck. \par Pt says her eyes become "inflamed", and her vision got better? Pt went off amlodipine, and went back to normal\par \par Pt says she has R eye pain, and visual disturbances. Pt says she sees "stars" in periphery. Went to see optho, and was told she only  has dry eyes. \par \par Pt also gets full sinus pain. Pt also says she gets ear plugging and tinnitus. \par \par Pt feels she has congestion in her chest. \par \par Pt says she gets burning sensation in face. Pt also gets flushing her body. Pt says she gets patches of skin that burn. Reports warmth in upper extremities and cold in bottom. Gets hives in skin. \par \par Reports weight loss of 10 lb, without trying. She has low appetite and nausea. \par \par All this symptoms come and go. Developed neck pain today, which went away. \par \par Pt has hx of Raynaud's in her 20s. Discoloration of finger tips in red, blue, purple. \par \par Used to have joint pain in past that has come back. It is transient. \par \par No fevers, h/a,  hair loss, oral ulcers, epistaxis, sinusitis,  swollen glands, dry mouth,  CP,  cough, vision changes, abdominal pain, GERD, n/v/d, blood in stool or urine, focal weakness, sensory loss\par +dry eyes- on artificial tears, and punctual plugs \par +abdominal pain \par +frequent bowel movements \par \par reports she had a rheumatoid nodule in her R foot, that was removed \par \par OB: no hx of miscarriages \par

## 2022-09-29 NOTE — PHYSICAL EXAM
[General Appearance - Well Nourished] : well nourished [General Appearance - Well Developed] : well developed [Sclera] : the sclera and conjunctiva were normal [Auscultation Breath Sounds / Voice Sounds] : lungs were clear to auscultation bilaterally [Heart Sounds] : normal S1 and S2 [Heart Sounds Gallop] : no gallops [Murmurs] : no murmurs [Heart Sounds Pericardial Friction Rub] : no pericardial rub [Abdomen Soft] : soft [Abdomen Tenderness] : non-tender [] : no hepato-splenomegaly [Musculoskeletal - Swelling] : no joint swelling seen [FreeTextEntry1] : blanching erythema of face, chest  [Oriented To Time, Place, And Person] : oriented to person, place, and time

## 2022-09-29 NOTE — CONSULT LETTER
[Dear  ___] : Dear  [unfilled], [Consult Letter:] : I had the pleasure of evaluating your patient, [unfilled]. [Consult Closing:] : Thank you very much for allowing me to participate in the care of this patient.  If you have any questions, please do not hesitate to contact me. [Sincerely,] : Sincerely, [FreeTextEntry3] : Puneet Arias MD

## 2022-09-29 NOTE — ASSESSMENT
[FreeTextEntry1] : 38 y/o F w multiple complaints\par =transient joint pain\par =past hx of Raynaud's\par =report of R foot rheumatoid nodule\par =sinus congestion\par =rash and hives that comes and go\par =significant dry eyes \par =visual periphery with "blue spots" \par =h/a\par \par I am unsure if there is a rheumatologic condition that will explain all of patient's symptoms. Possibly related to rheumatologic condition is dry eyes, Raynaud's. Pt has joint pain but transient? Seems unusual for it to be a rheumatologic condition. \par \par Will send extensive serologies. \par \par Plan\par -Labs w serologies, inflammatory markers\par RTO depending on above results

## 2022-10-03 PROBLEM — F41.9 ANXIETY: Status: ACTIVE | Noted: 2022-07-18

## 2022-10-03 PROBLEM — I73.00 RAYNAUD'S DISEASE: Status: ACTIVE | Noted: 2022-09-29

## 2022-10-03 LAB
ALBUMIN SERPL ELPH-MCNC: 5.1 G/DL
ALP BLD-CCNC: 64 U/L
ALT SERPL-CCNC: 17 U/L
ANA SER IF-ACNC: NEGATIVE
ANION GAP SERPL CALC-SCNC: 12 MMOL/L
APPEARANCE: CLEAR
AST SERPL-CCNC: 18 U/L
BACTERIA: NEGATIVE
BASOPHILS # BLD AUTO: 0.06 K/UL
BASOPHILS NFR BLD AUTO: 1 %
BILIRUB SERPL-MCNC: 0.6 MG/DL
BILIRUBIN URINE: NEGATIVE
BLOOD URINE: NEGATIVE
BUN SERPL-MCNC: 15 MG/DL
C3 SERPL-MCNC: 88 MG/DL
C4 SERPL-MCNC: 28 MG/DL
CALCIUM SERPL-MCNC: 10 MG/DL
CCP AB SER IA-ACNC: <8 UNITS
CENTROMERE IGG SER-ACNC: <0.2 CD:130001892
CHLORIDE SERPL-SCNC: 102 MMOL/L
CK SERPL-CCNC: 115 U/L
CO2 SERPL-SCNC: 23 MMOL/L
COLOR: COLORLESS
CREAT SERPL-MCNC: 0.8 MG/DL
CREAT SPEC-SCNC: 27 MG/DL
CREAT/PROT UR: 0.2 RATIO
CRP SERPL-MCNC: <3 MG/L
DSDNA AB SER-ACNC: <12 IU/ML
EGFR: 97 ML/MIN/1.73M2
ENA RNP AB SER IA-ACNC: <0.2 AL
ENA SCL70 IGG SER IA-ACNC: <0.2 AL
ENA SM AB SER IA-ACNC: <0.2 AL
ENA SS-A AB SER IA-ACNC: <0.2 AL
ENA SS-B AB SER IA-ACNC: <0.2 AL
EOSINOPHIL # BLD AUTO: 0.05 K/UL
EOSINOPHIL NFR BLD AUTO: 0.8 %
ERYTHROCYTE [SEDIMENTATION RATE] IN BLOOD BY WESTERGREN METHOD: 2 MM/HR
G6PD SER-CCNC: 12.6 U/G HGB
GLUCOSE QUALITATIVE U: NEGATIVE
GLUCOSE SERPL-MCNC: 141 MG/DL
HCT VFR BLD CALC: 36.1 %
HGB BLD-MCNC: 12.3 G/DL
HYALINE CASTS: 1 /LPF
IMM GRANULOCYTES NFR BLD AUTO: 0.2 %
KETONES URINE: NEGATIVE
LEUKOCYTE ESTERASE URINE: ABNORMAL
LYMPHOCYTES # BLD AUTO: 1.98 K/UL
LYMPHOCYTES NFR BLD AUTO: 31.5 %
MAN DIFF?: NORMAL
MCHC RBC-ENTMCNC: 30 PG
MCHC RBC-ENTMCNC: 34.1 GM/DL
MCV RBC AUTO: 88 FL
MICROSCOPIC-UA: NORMAL
MONOCYTES # BLD AUTO: 0.53 K/UL
MONOCYTES NFR BLD AUTO: 8.4 %
MYELOPEROXIDASE AB SER QL IA: <5 UNITS
MYELOPEROXIDASE CELLS FLD QL: NEGATIVE
NEUTROPHILS # BLD AUTO: 3.66 K/UL
NEUTROPHILS NFR BLD AUTO: 58.1 %
NITRITE URINE: NEGATIVE
PH URINE: 6
PLATELET # BLD AUTO: 357 K/UL
POTASSIUM SERPL-SCNC: 4.3 MMOL/L
PROT SERPL-MCNC: 7.3 G/DL
PROT UR-MCNC: 6 MG/DL
PROTEIN URINE: NEGATIVE
PROTEINASE3 AB SER IA-ACNC: <5 UNITS
PROTEINASE3 AB SER-ACNC: NEGATIVE
RBC # BLD: 4.1 M/UL
RBC # FLD: 12.6 %
RED BLOOD CELLS URINE: 0 /HPF
RF+CCP IGG SER-IMP: NEGATIVE
RHEUMATOID FACT SER QL: <10 IU/ML
SODIUM SERPL-SCNC: 137 MMOL/L
SPECIFIC GRAVITY URINE: 1.01
SQUAMOUS EPITHELIAL CELLS: 2 /HPF
UROBILINOGEN URINE: NORMAL
WBC # FLD AUTO: 6.29 K/UL
WHITE BLOOD CELLS URINE: 1 /HPF

## 2022-10-03 NOTE — HISTORY OF PRESENT ILLNESS
[FreeTextEntry1] : 36yo  with anxiety, s/p   c/b post partum PEC req readmission and Mg (BP and headache, PCR 0.8g) here for blood pressure management\par \par OB Dr. Nasra Rahman\par Rheumatologist Dr. Arias\par \par h/o "rheumatoid nodule", Raynaud's in the past. Increasingly so over the past 6 weeks has been having LE decreased sensation around shins, tingling around ankles, pale skin. Headaches. \par BP well controlled \par prn Propranalol for performance anxiety

## 2022-10-03 NOTE — ASSESSMENT
[FreeTextEntry1] : 36yo  with anxiety, s/p   c/b post partum PEC req readmission and Mg (BP and headache) here for blood pressure management\par \par - HTN well controlled, Significant white coat htn. Ok to take propranolol \par - concerned with her sx that there may be an underlying systemic process going on, she has an elvin with rheumatology tomorrow

## 2022-10-05 ENCOUNTER — NON-APPOINTMENT (OUTPATIENT)
Age: 37
End: 2022-10-05

## 2022-10-05 LAB — RNA POLYMERASE III IGG: 4 UNITS

## 2022-10-10 LAB — ANTI-PM/SCL-100 AB: <20 UNITS

## 2022-10-12 ENCOUNTER — NON-APPOINTMENT (OUTPATIENT)
Age: 37
End: 2022-10-12

## 2022-10-12 LAB
ALBUMIN MFR SERPL ELPH: 65.8 %
ALBUMIN SERPL-MCNC: 4.7 G/DL
ALBUMIN/GLOB SERPL: 2 RATIO
ALPHA1 GLOB MFR SERPL ELPH: 3.1 %
ALPHA1 GLOB SERPL ELPH-MCNC: 0.2 G/DL
ALPHA2 GLOB MFR SERPL ELPH: 8.4 %
ALPHA2 GLOB SERPL ELPH-MCNC: 0.6 G/DL
B-GLOBULIN MFR SERPL ELPH: 10.3 %
B-GLOBULIN SERPL ELPH-MCNC: 0.7 G/DL
GAMMA GLOB FLD ELPH-MCNC: 0.9 G/DL
GAMMA GLOB MFR SERPL ELPH: 12.4 %
INTERPRETATION SERPL IEP-IMP: NORMAL
M PROTEIN SPEC IFE-MCNC: NORMAL
PROT SERPL-MCNC: 7.1 G/DL
PROT SERPL-MCNC: 7.1 G/DL

## 2022-10-14 ENCOUNTER — APPOINTMENT (OUTPATIENT)
Dept: HEART AND VASCULAR | Facility: CLINIC | Age: 37
End: 2022-10-14

## 2022-10-14 ENCOUNTER — NON-APPOINTMENT (OUTPATIENT)
Age: 37
End: 2022-10-14

## 2022-10-14 VITALS
HEART RATE: 85 BPM | BODY MASS INDEX: 20.61 KG/M2 | WEIGHT: 112 LBS | OXYGEN SATURATION: 99 % | SYSTOLIC BLOOD PRESSURE: 185 MMHG | DIASTOLIC BLOOD PRESSURE: 85 MMHG | HEIGHT: 62 IN

## 2022-10-14 DIAGNOSIS — R20.9 UNSPECIFIED DISTURBANCES OF SKIN SENSATION: ICD-10-CM

## 2022-10-14 PROCEDURE — 99203 OFFICE O/P NEW LOW 30 MIN: CPT | Mod: 25

## 2022-10-14 PROCEDURE — 93000 ELECTROCARDIOGRAM COMPLETE: CPT

## 2022-10-18 ENCOUNTER — APPOINTMENT (OUTPATIENT)
Dept: OTOLARYNGOLOGY | Facility: CLINIC | Age: 37
End: 2022-10-18

## 2022-10-18 DIAGNOSIS — J34.3 HYPERTROPHY OF NASAL TURBINATES: ICD-10-CM

## 2022-10-18 DIAGNOSIS — J34.2 DEVIATED NASAL SEPTUM: ICD-10-CM

## 2022-10-18 DIAGNOSIS — T48.5X5A CHRONIC RHINITIS: ICD-10-CM

## 2022-10-18 DIAGNOSIS — R44.8 OTHER SYMPTOMS AND SIGNS INVOLVING GENERAL SENSATIONS AND PERCEPTIONS: ICD-10-CM

## 2022-10-18 DIAGNOSIS — J31.0 CHRONIC RHINITIS: ICD-10-CM

## 2022-10-18 DIAGNOSIS — J32.8 OTHER CHRONIC SINUSITIS: ICD-10-CM

## 2022-10-18 PROCEDURE — 31231 NASAL ENDOSCOPY DX: CPT

## 2022-10-18 NOTE — REVIEW OF SYSTEMS
[Patient Intake Form Reviewed] : Patient intake form was reviewed [Ear Noises] : ear noises [As Noted in HPI] : as noted in HPI [Nasal Congestion] : nasal congestion [Sinus Pain] : sinus pain [Sinus Pressure] : sinus pressure [Negative] : Heme/Lymph

## 2022-10-20 ENCOUNTER — OFFICE (OUTPATIENT)
Dept: URBAN - METROPOLITAN AREA CLINIC 28 | Facility: CLINIC | Age: 37
Setting detail: OPHTHALMOLOGY
End: 2022-10-20
Payer: COMMERCIAL

## 2022-10-20 DIAGNOSIS — H01.004: ICD-10-CM

## 2022-10-20 DIAGNOSIS — H53.10: ICD-10-CM

## 2022-10-20 DIAGNOSIS — H01.001: ICD-10-CM

## 2022-10-20 DIAGNOSIS — H00.15: ICD-10-CM

## 2022-10-20 PROCEDURE — 92012 INTRM OPH EXAM EST PATIENT: CPT | Performed by: OPHTHALMOLOGY

## 2022-10-21 PROBLEM — H00.15 CHALAZION; LEFT LOWER LID: Status: ACTIVE | Noted: 2022-08-20

## 2022-10-21 PROBLEM — H01.004 BLEPHARITIS; RIGHT UPPER LID, LEFT UPPER LID: Status: ACTIVE | Noted: 2022-10-20

## 2022-10-21 PROBLEM — H01.001 BLEPHARITIS; RIGHT UPPER LID, LEFT UPPER LID: Status: ACTIVE | Noted: 2022-10-20

## 2022-10-21 PROBLEM — H53.10 SUBJECTIVE VISUAL DISTRUBANCES ; BOTH EYES: Status: ACTIVE | Noted: 2022-08-20

## 2022-10-21 ASSESSMENT — VISUAL ACUITY
OS_BCVA: 20/20-2
OD_BCVA: 20/20-1

## 2022-10-21 ASSESSMENT — LID EXAM ASSESSMENTS
OS_BLEPHARITIS: LUL 1+
OD_BLEPHARITIS: RUL 1+

## 2022-11-01 NOTE — REASON FOR VISIT
[Initial Evaluation] : an initial evaluation for [FreeTextEntry2] : Persistent sinusitis over the past 20 years.   Patient states her level of severity is a level 8 out of 10 and it occurs constant.  Patient states nothing helps to improve or worsens her Persistent sinusitis over the past 20 years.

## 2022-11-01 NOTE — CONSULT LETTER
[Dear  ___] : Dear  [unfilled], [Consult Letter:] : I had the pleasure of evaluating your patient, [unfilled]. [Please see my note below.] : Please see my note below. [Consult Closing:] : Thank you very much for allowing me to participate in the care of this patient.  If you have any questions, please do not hesitate to contact me. [Sincerely,] : Sincerely, [FreeTextEntry3] : Leroy Suárez MD, FACS\par Professor of Otolaryngology, [employer] at [facility]\par Director, [facility], Department of Otolaryngology, [facility]\par , Head & Neck Service Line, [facility]\par

## 2022-11-01 NOTE — HISTORY OF PRESENT ILLNESS
[de-identified] : 37 years old female patient with history of Persistent sinusitis over the past 20 years.   Patient is present today in the office with Rhinitis Medicamentosa.  Turbinate Hypertrophy.  Deviated Nasal Septum.  Rhinitis

## 2022-11-01 NOTE — PROCEDURE
[Congested] : congested [Allergic] : allergic signs [Deviated to the Rt] : deviated to the right [Image(s) Captured] : image(s) captured and filed [Topical Lidocaine] : topical lidocaine [Oxymetazoline HCl] : oxymetazoline HCl [Flexible Endoscope] : examined with the flexible endoscope [Serial Number: ___] : Serial Number: [unfilled] [FreeTextEntry6] :  Rhinitis Medicamentosa.  Turbinate Hypertrophy.  Deviated Nasal Septum.  Rhinitis  [de-identified] :  Rhinitis Medicamentosa.  Turbinate Hypertrophy.  Deviated Nasal Septum.  Rhinitis

## 2022-11-01 NOTE — PHYSICAL EXAM
[] : septum deviated bilaterally [Normal] : mucosa is normal [Midline] : trachea located in midline position [de-identified] :  Rhinitis Medicamentosa.  Turbinate Hypertrophy.  Deviated Nasal Septum.  Rhinitis

## 2022-11-02 ENCOUNTER — APPOINTMENT (OUTPATIENT)
Dept: ENDOCRINOLOGY | Facility: CLINIC | Age: 37
End: 2022-11-02

## 2022-11-10 ENCOUNTER — APPOINTMENT (OUTPATIENT)
Dept: VASCULAR SURGERY | Facility: CLINIC | Age: 37
End: 2022-11-10

## 2022-11-10 PROCEDURE — 93925 LOWER EXTREMITY STUDY: CPT

## 2022-11-10 NOTE — DISCUSSION/SUMMARY
[FreeTextEntry1] : 37F, no cardiac history, recently had baby#2 in June of this year. She was diagnosed with preeclampsia post partum, and was hospitalized for it at [facility]. \par \par #HTN/Pre-eclampsia\par BP is well controlled today. Reviewed 24 hour ambulatory monitor results from nephrology, and all within normal ranges. No need for antiHTN therapy. \par Obtain TTE in setting of elevated BPs and pre-eclampsia\par Obtain labs. \par \par #Leg cramping/Numness\par Obtain LE dopplers to r/o organic circulatory cause of symptoms. \par \par #Dizziness\par Recommended to wear compression stockings to help alleviate symptoms. \par \par Follow up when tests have resulted.

## 2022-11-10 NOTE — REVIEW OF SYSTEMS
[Headache] : headache [Feeling Fatigued] : feeling fatigued [Blurry Vision] : blurred vision [Dizziness] : dizziness [Numbness (Hypoesthesia)] : numbness [Limb Weakness (Paresis)] : limb weakness (Paresis) [Negative] : Heme/Lymph [SOB] : no shortness of breath [Dyspnea on exertion] : not dyspnea during exertion [Palpitations] : no palpitations [Orthopnea] : no orthopnea [Syncope] : no syncope

## 2022-11-10 NOTE — HISTORY OF PRESENT ILLNESS
[FreeTextEntry1] : 37F, no cardiac history, recently had baby#2 in June of this year. She was diagnosed with preeclampsia post partum, and was hospitalized for it at [facility]. Since then her blood pressures have been relatively stable, although she does not feel at her baseline. She complains of dizziness, headaches, and overall feeling of being weak. She has extreme anxiety regarding getting her blood pressure checked- which she is aware of- and opts not to check it often. She also complains of weakness in the legs, and says that her feet appear more pale and is concerned over the blood flow to her legs. She admits to some cramping of her shins and her feet.

## 2022-11-15 ENCOUNTER — APPOINTMENT (OUTPATIENT)
Dept: HEART AND VASCULAR | Facility: CLINIC | Age: 37
End: 2022-11-15

## 2022-11-15 VITALS
OXYGEN SATURATION: 99 % | HEIGHT: 62 IN | RESPIRATION RATE: 15 BRPM | DIASTOLIC BLOOD PRESSURE: 80 MMHG | BODY MASS INDEX: 21.35 KG/M2 | WEIGHT: 116 LBS | HEART RATE: 80 BPM | SYSTOLIC BLOOD PRESSURE: 130 MMHG | TEMPERATURE: 98 F

## 2022-11-15 PROCEDURE — 93306 TTE W/DOPPLER COMPLETE: CPT

## 2022-11-16 ENCOUNTER — APPOINTMENT (OUTPATIENT)
Dept: OTOLARYNGOLOGY | Facility: CLINIC | Age: 37
End: 2022-11-16

## 2022-11-28 ENCOUNTER — APPOINTMENT (OUTPATIENT)
Dept: NEPHROLOGY | Facility: CLINIC | Age: 37
End: 2022-11-28

## 2022-11-28 DIAGNOSIS — R09.89 OTHER SPECIFIED SYMPTOMS AND SIGNS INVOLVING THE CIRCULATORY AND RESPIRATORY SYSTEMS: ICD-10-CM

## 2022-11-28 DIAGNOSIS — M35.3 POLYMYALGIA RHEUMATICA: ICD-10-CM

## 2022-11-28 PROCEDURE — 99213 OFFICE O/P EST LOW 20 MIN: CPT | Mod: 95

## 2022-11-28 RX ORDER — BUPROPION HYDROCHLORIDE 150 MG/1
150 TABLET, EXTENDED RELEASE ORAL
Qty: 30 | Refills: 0 | Status: DISCONTINUED | COMMUNITY
Start: 2022-07-22 | End: 2022-11-28

## 2022-11-28 RX ORDER — NORETHINDRONE 0.35 MG/1
0.35 TABLET ORAL
Refills: 0 | Status: DISCONTINUED | COMMUNITY
End: 2022-11-28

## 2022-11-28 RX ORDER — RIMEGEPANT SULFATE 75 MG/75MG
75 TABLET, ORALLY DISINTEGRATING ORAL
Refills: 0 | Status: DISCONTINUED | COMMUNITY
End: 2022-11-28

## 2022-11-28 NOTE — ASSESSMENT
[FreeTextEntry1] : 36yo  with anxiety, s/p   c/b post partum PEC req readmission and Mg (BP and headache) here for blood pressure management\par \par - HTN well controlled, Significant white coat htn. Stopped ocps and propranolol, using lexapro now\par - reviewed echo, rheum w/u and cardioogy notes - unremarkable. \par - she is going to see Immunologist Dr. Talia Hamm for consultation 20-Aug-2022 22:20

## 2022-11-28 NOTE — HISTORY OF PRESENT ILLNESS
[FreeTextEntry1] : 38yo  with anxiety, s/p   c/b post partum PEC req readmission and Mg (BP and headache, PCR 0.8g) here for blood pressure management via video\par \par OB Dr. Nasra Rahman\par Rheumatologist Dr. Arias\par \par h/o "rheumatoid nodule", Raynaud's in the past. Increasingly so over the past 6 weeks has been having LE decreased sensation around shins, tingling around ankles, pale skin. Headaches. \par BP well controlled 120s/70s, still rise in relation to \par prn Propranalol for performance anxiety

## 2022-12-02 ENCOUNTER — APPOINTMENT (OUTPATIENT)
Dept: HEART AND VASCULAR | Facility: CLINIC | Age: 37
End: 2022-12-02

## 2022-12-02 DIAGNOSIS — R42 DIZZINESS AND GIDDINESS: ICD-10-CM

## 2022-12-02 DIAGNOSIS — R25.2 CRAMP AND SPASM: ICD-10-CM

## 2022-12-02 PROCEDURE — 99212 OFFICE O/P EST SF 10 MIN: CPT | Mod: 95

## 2022-12-09 PROBLEM — R42 DIZZINESS: Status: ACTIVE | Noted: 2022-12-09

## 2022-12-09 PROBLEM — R25.2 CRAMPING OF FEET: Status: ACTIVE | Noted: 2022-10-14

## 2022-12-09 NOTE — DISCUSSION/SUMMARY
[FreeTextEntry1] : 37F evaluated recently for dizziness and leg cramping which have improved\par \par Dizziness: now resolved. TTE normal. Last ECG without worrisome findings.\par \par Leg cramping: improved. LE duplex negative. Pt to follow up with her rheumatologist

## 2022-12-09 NOTE — HISTORY OF PRESENT ILLNESS
[FreeTextEntry1] : 37F, no cardiac history, recently evaluated for weakness. Telehealth visit today to update pt on workup that was recently completed.\par \par I was located at [address] [place] [address]. Patient was located at her home. \par \par Physical exam not performed due to nature of visit but information relayed from last visit\par \par Pt reports that her symptoms have improved especially her dizziness. She still feels the weakness in her extremities at times. She has been reassured by her nephrologist that she does not need BP meds. Her recent echo showed normal biventricular function. LE duplex also negative for any arterial blockages. \par

## 2023-02-25 LAB
CHOLEST SERPL-MCNC: 167 MG/DL
HDLC SERPL-MCNC: 80 MG/DL
LDLC SERPL CALC-MCNC: 79 MG/DL
NONHDLC SERPL-MCNC: 86 MG/DL
TRIGL SERPL-MCNC: 35 MG/DL

## 2023-03-18 NOTE — PATIENT PROFILE OB - PRENATAL LABORATORY TESTS, INFANT PROFILE
Unable to obtain any triage vital signs. Pt is paraplegic and grabbing at ED RN, asking to lay on the floor. Pt was instructed to not lay in the floor. Pt not cooperative at this time. 04-Dec-2021 04:00 COVID

## 2023-05-06 ENCOUNTER — NON-APPOINTMENT (OUTPATIENT)
Age: 38
End: 2023-05-06

## 2023-08-08 ENCOUNTER — APPOINTMENT (OUTPATIENT)
Dept: MATERNAL FETAL MEDICINE | Facility: CLINIC | Age: 38
End: 2023-08-08
Payer: COMMERCIAL

## 2023-08-08 PROCEDURE — 99205 OFFICE O/P NEW HI 60 MIN: CPT | Mod: 95

## 2023-08-11 NOTE — DISCHARGE NOTE OB - PLAN OF CARE
Vahid has been notified! She does have a history of fever blisters.    Monitor blood pressure twice daily with an electronic blood pressure cuff. Document blood pressures to review with obstetrician at follow-up appointment. Return to hospital for systolic blood pressure (top number) equal to or greater than 160 AND/OR diastolic blood pressure (bottom number)equal to or greater than 110 OR any of the following symptoms: changes in vision, headache not relieved with Tylenol, severe abdominal pain, vomiting, increased vaginal bleeding, chest pain or shortness of breath. Call obstetrician for persistent systolic blood pressure (top number) equal to or greater than 150 AND/OR diastolic blood pressure (bottom number) equal to or greater than 100. Follow-up with obstetrician in 1 week for blood pressure check.    Take your Labetalol 200 3 times per day and Nifedipine 30 once per day. 160.02

## 2023-08-17 NOTE — DISCUSSION/SUMMARY
[FreeTextEntry1] :  History of pre-eclampsia (postpartum), now with presumed white coat HTN: Preeclampsia refers to a syndrome characterized by the new onset of hypertension plus proteinuria, end-organ dysfunction, or both after 20 weeks of gestation in a previously normotensive woman. We discussed what preeclampsia is and the relation to placental function. Based on the history of preeclampsia in her prior pregnancy she is at increased risk for recurrent disease in a future pregnancy. Low-dose aspirin reduces the frequency of preeclampsia by about 10 to 20 percent when given to women at moderate to high risk of the disease. It has an excellent maternal/fetal safety profile in pregnancy; thus, it is a reasonable preventive strategy for these women.  Based on the available data recommend initiating low dose aspirin for preeclampsia prevention at 12 weeks of gestation, and ideally prior to 16 weeks. Early therapy (before 16 weeks) may be important since the pathophysiologic features of preeclampsia develop early in pregnancy, weeks before clinical disease is apparent. However, available evidence is inconsistent about the importance of early initiation of therapy, possibly because aspirin has major effects on prostacyclin production and endothelial function throughout gestation. If aspirin is not initiated at the end of the first trimester, initiation after 16 weeks (but before symptoms develop) may also be effective. The optimal dose of aspirin for preeclampsia prevention is controversial. Randomized trials of aspirin for preeclampsia prevention have used doses ranging from 50 to 150 mg daily. In the [place], the higher dosing can be achieved by taking one and one-half 81 mg tablets; however, taking one 81 mg tablet is also reasonable since this is the commercially available dose and has proven efficacy. Good compliance with therapy is important; compliance <90 percent may not be effective. There is no consensus on the optimal timing of aspirin discontinuation. Recommend continuation of asprin therapy until 38 weeks or delivery.   Recommendations: The patient will plan to begin bASA at 12 wga to mitigate the risk of pre-eclampsia. A baseline 24 hr urine protein and pre-eclampsia labs  should be collected. We discussed the risk for recurrent pre-eclampsia and health optimization. We also discussed the role of antepartum surveillance. We reviewed the physiologic changes of pregnancy and the anticipated blood pressure changes. In the event that the patient is admitted to the antepartum or postpartum service with HTN concerns, plan for an early consultation with MFM and/or cardiology to optimize a medical regimen given her concerns due to management changes with her prior admission. The patient was offered a referral with a reproductive psychologist due to her anxiety however currently declines. We discussed coping strategies and wellness resources.

## 2023-08-17 NOTE — HISTORY OF PRESENT ILLNESS
[Home] : at home, [unfilled] , at the time of the visit. [Medical Office: ([facility])___] : at the medical office located in  [Verbal consent obtained from patient] : the patient, [unfilled] [FreeTextEntry1] :  Ms. Fishman is a 37 yo  at 9 4/7 wga (KEITH 3/8/24) presenting for MFM consultation. The patient is currently without concern.   Her OB history is as follows: G1 termination G2  Reports no complications, 6lbs 8oz, , presented in labor G3 The patient reports an uncomplicated antepartum period and delivery. Elective IOL at 39 wga, 6 lbs 5oz. She was home for one week and was then readmitted postpartum pre-eclampsia for elevated BP and received MgSO4. She was treated with amlodipine, labetalol, and nifedipine. She noted considerable side effects (headaches, brain fog) as a result of these medications. The patient was evaluated by nephrology and cardiology.  G4 current   The patient states a considerable amount of anxiety due to her postpartum admission and diagnosis of pre-eclampsia. She states that this has resulted in extreme anxiety whenever a blood pressure reading is associated in a medical setting. She performs home blood pressure monitoring and reports normotensive values. Her anxiety regarding HTN is significant enough that she is now, in the first trimester, already apprehensive regarding her delivery and the post partum period.   She reports no additional medical or surgical history. Her only medication is PNV. There is no family history of birth defects, intellectual disability, genetic conditions, or stillbirths.

## 2023-08-25 ENCOUNTER — APPOINTMENT (OUTPATIENT)
Dept: ANTEPARTUM | Facility: CLINIC | Age: 38
End: 2023-08-25
Payer: COMMERCIAL

## 2023-08-25 ENCOUNTER — ASOB RESULT (OUTPATIENT)
Age: 38
End: 2023-08-25

## 2023-08-25 PROCEDURE — 76813 OB US NUCHAL MEAS 1 GEST: CPT

## 2023-08-25 PROCEDURE — 36415 COLL VENOUS BLD VENIPUNCTURE: CPT

## 2023-08-25 PROCEDURE — 93976 VASCULAR STUDY: CPT

## 2023-09-22 ENCOUNTER — APPOINTMENT (OUTPATIENT)
Dept: ANTEPARTUM | Facility: CLINIC | Age: 38
End: 2023-09-22
Payer: COMMERCIAL

## 2023-09-22 ENCOUNTER — ASOB RESULT (OUTPATIENT)
Age: 38
End: 2023-09-22

## 2023-09-22 PROCEDURE — 76817 TRANSVAGINAL US OBSTETRIC: CPT

## 2023-09-22 PROCEDURE — 76805 OB US >/= 14 WKS SNGL FETUS: CPT

## 2023-10-27 ENCOUNTER — APPOINTMENT (OUTPATIENT)
Dept: ANTEPARTUM | Facility: CLINIC | Age: 38
End: 2023-10-27
Payer: COMMERCIAL

## 2023-10-27 ENCOUNTER — ASOB RESULT (OUTPATIENT)
Age: 38
End: 2023-10-27

## 2023-10-27 PROCEDURE — 76817 TRANSVAGINAL US OBSTETRIC: CPT | Mod: 59

## 2023-10-27 PROCEDURE — 76811 OB US DETAILED SNGL FETUS: CPT

## 2023-11-08 ENCOUNTER — APPOINTMENT (OUTPATIENT)
Dept: ANTEPARTUM | Facility: CLINIC | Age: 38
End: 2023-11-08
Payer: COMMERCIAL

## 2023-11-08 ENCOUNTER — ASOB RESULT (OUTPATIENT)
Age: 38
End: 2023-11-08

## 2023-11-08 PROCEDURE — 76817 TRANSVAGINAL US OBSTETRIC: CPT

## 2023-11-08 PROCEDURE — 76816 OB US FOLLOW-UP PER FETUS: CPT

## 2023-12-22 ENCOUNTER — APPOINTMENT (OUTPATIENT)
Dept: ANTEPARTUM | Facility: CLINIC | Age: 38
End: 2023-12-22
Payer: COMMERCIAL

## 2023-12-22 ENCOUNTER — ASOB RESULT (OUTPATIENT)
Age: 38
End: 2023-12-22

## 2023-12-22 PROCEDURE — 76816 OB US FOLLOW-UP PER FETUS: CPT | Mod: 59

## 2023-12-22 PROCEDURE — 76820 UMBILICAL ARTERY ECHO: CPT | Mod: 59

## 2023-12-22 PROCEDURE — 76819 FETAL BIOPHYS PROFIL W/O NST: CPT

## 2024-01-26 ENCOUNTER — APPOINTMENT (OUTPATIENT)
Dept: ANTEPARTUM | Facility: CLINIC | Age: 39
End: 2024-01-26
Payer: COMMERCIAL

## 2024-01-26 ENCOUNTER — ASOB RESULT (OUTPATIENT)
Age: 39
End: 2024-01-26

## 2024-01-26 PROCEDURE — 76816 OB US FOLLOW-UP PER FETUS: CPT

## 2024-01-26 PROCEDURE — 76819 FETAL BIOPHYS PROFIL W/O NST: CPT

## 2024-01-26 PROCEDURE — 76820 UMBILICAL ARTERY ECHO: CPT | Mod: 59

## 2024-01-29 ENCOUNTER — TRANSCRIPTION ENCOUNTER (OUTPATIENT)
Age: 39
End: 2024-01-29

## 2024-02-08 ENCOUNTER — NON-APPOINTMENT (OUTPATIENT)
Age: 39
End: 2024-02-08

## 2024-02-13 ENCOUNTER — APPOINTMENT (OUTPATIENT)
Dept: ANTEPARTUM | Facility: CLINIC | Age: 39
End: 2024-02-13

## 2024-02-17 ENCOUNTER — INPATIENT (INPATIENT)
Facility: HOSPITAL | Age: 39
LOS: 0 days | Discharge: HOME | End: 2024-02-18
Attending: STUDENT IN AN ORGANIZED HEALTH CARE EDUCATION/TRAINING PROGRAM | Admitting: STUDENT IN AN ORGANIZED HEALTH CARE EDUCATION/TRAINING PROGRAM
Payer: COMMERCIAL

## 2024-02-17 VITALS
HEART RATE: 107 BPM | WEIGHT: 145.06 LBS | RESPIRATION RATE: 18 BRPM | TEMPERATURE: 99 F | DIASTOLIC BLOOD PRESSURE: 84 MMHG | SYSTOLIC BLOOD PRESSURE: 144 MMHG | HEIGHT: 62 IN | OXYGEN SATURATION: 99 %

## 2024-02-17 LAB
ALBUMIN SERPL ELPH-MCNC: 3.4 G/DL — SIGNIFICANT CHANGE UP (ref 3.3–5)
ALP SERPL-CCNC: 175 U/L — HIGH (ref 40–120)
ALT FLD-CCNC: 9 U/L — LOW (ref 10–45)
ANION GAP SERPL CALC-SCNC: 11 MMOL/L — SIGNIFICANT CHANGE UP (ref 5–17)
APTT BLD: 28.1 SEC — SIGNIFICANT CHANGE UP (ref 24.5–35.6)
AST SERPL-CCNC: 15 U/L — SIGNIFICANT CHANGE UP (ref 10–40)
BASOPHILS # BLD AUTO: 0.05 K/UL — SIGNIFICANT CHANGE UP (ref 0–0.2)
BASOPHILS NFR BLD AUTO: 0.6 % — SIGNIFICANT CHANGE UP (ref 0–2)
BILIRUB SERPL-MCNC: 0.3 MG/DL — SIGNIFICANT CHANGE UP (ref 0.2–1.2)
BLD GP AB SCN SERPL QL: NEGATIVE — SIGNIFICANT CHANGE UP
BUN SERPL-MCNC: 10 MG/DL — SIGNIFICANT CHANGE UP (ref 7–23)
CALCIUM SERPL-MCNC: 9.2 MG/DL — SIGNIFICANT CHANGE UP (ref 8.4–10.5)
CHLORIDE SERPL-SCNC: 106 MMOL/L — SIGNIFICANT CHANGE UP (ref 96–108)
CO2 SERPL-SCNC: 21 MMOL/L — LOW (ref 22–31)
CREAT ?TM UR-MCNC: 190 MG/DL — SIGNIFICANT CHANGE UP
CREAT SERPL-MCNC: 0.79 MG/DL — SIGNIFICANT CHANGE UP (ref 0.5–1.3)
EGFR: 98 ML/MIN/1.73M2 — SIGNIFICANT CHANGE UP
EOSINOPHIL # BLD AUTO: 0.11 K/UL — SIGNIFICANT CHANGE UP (ref 0–0.5)
EOSINOPHIL NFR BLD AUTO: 1.2 % — SIGNIFICANT CHANGE UP (ref 0–6)
FIBRINOGEN PPP-MCNC: 520 MG/DL — HIGH (ref 200–445)
GLUCOSE SERPL-MCNC: 105 MG/DL — HIGH (ref 70–99)
HCT VFR BLD CALC: 31.1 % — LOW (ref 34.5–45)
HGB BLD-MCNC: 10.9 G/DL — LOW (ref 11.5–15.5)
IMM GRANULOCYTES NFR BLD AUTO: 0.8 % — SIGNIFICANT CHANGE UP (ref 0–0.9)
INR BLD: 0.84 — LOW (ref 0.85–1.18)
LDH SERPL L TO P-CCNC: 142 U/L — SIGNIFICANT CHANGE UP (ref 50–242)
LYMPHOCYTES # BLD AUTO: 1.75 K/UL — SIGNIFICANT CHANGE UP (ref 1–3.3)
LYMPHOCYTES # BLD AUTO: 19.8 % — SIGNIFICANT CHANGE UP (ref 13–44)
MCHC RBC-ENTMCNC: 31.1 PG — SIGNIFICANT CHANGE UP (ref 27–34)
MCHC RBC-ENTMCNC: 35 GM/DL — SIGNIFICANT CHANGE UP (ref 32–36)
MCV RBC AUTO: 88.9 FL — SIGNIFICANT CHANGE UP (ref 80–100)
MONOCYTES # BLD AUTO: 0.77 K/UL — SIGNIFICANT CHANGE UP (ref 0–0.9)
MONOCYTES NFR BLD AUTO: 8.7 % — SIGNIFICANT CHANGE UP (ref 2–14)
NEUTROPHILS # BLD AUTO: 6.11 K/UL — SIGNIFICANT CHANGE UP (ref 1.8–7.4)
NEUTROPHILS NFR BLD AUTO: 68.9 % — SIGNIFICANT CHANGE UP (ref 43–77)
NRBC # BLD: 0 /100 WBCS — SIGNIFICANT CHANGE UP (ref 0–0)
PLATELET # BLD AUTO: 422 K/UL — HIGH (ref 150–400)
POTASSIUM SERPL-MCNC: 4.2 MMOL/L — SIGNIFICANT CHANGE UP (ref 3.5–5.3)
POTASSIUM SERPL-SCNC: 4.2 MMOL/L — SIGNIFICANT CHANGE UP (ref 3.5–5.3)
PROT ?TM UR-MCNC: 30 MG/DL — HIGH (ref 0–12)
PROT SERPL-MCNC: 6.5 G/DL — SIGNIFICANT CHANGE UP (ref 6–8.3)
PROT/CREAT UR-RTO: 0.2 RATIO — SIGNIFICANT CHANGE UP (ref 0–0.2)
PROTHROM AB SERPL-ACNC: 9.6 SEC — SIGNIFICANT CHANGE UP (ref 9.5–13)
RBC # BLD: 3.5 M/UL — LOW (ref 3.8–5.2)
RBC # FLD: 11.8 % — SIGNIFICANT CHANGE UP (ref 10.3–14.5)
RH IG SCN BLD-IMP: POSITIVE — SIGNIFICANT CHANGE UP
SODIUM SERPL-SCNC: 138 MMOL/L — SIGNIFICANT CHANGE UP (ref 135–145)
T PALLIDUM AB TITR SER: NEGATIVE — SIGNIFICANT CHANGE UP
URATE SERPL-MCNC: 3.3 MG/DL — SIGNIFICANT CHANGE UP (ref 2.5–7)
WBC # BLD: 8.86 K/UL — SIGNIFICANT CHANGE UP (ref 3.8–10.5)
WBC # FLD AUTO: 8.86 K/UL — SIGNIFICANT CHANGE UP (ref 3.8–10.5)

## 2024-02-17 RX ORDER — CITRIC ACID/SODIUM CITRATE 300-500 MG
15 SOLUTION, ORAL ORAL EVERY 6 HOURS
Refills: 0 | Status: DISCONTINUED | OUTPATIENT
Start: 2024-02-17 | End: 2024-02-17

## 2024-02-17 RX ORDER — ACETAMINOPHEN 500 MG
975 TABLET ORAL
Refills: 0 | Status: DISCONTINUED | OUTPATIENT
Start: 2024-02-17 | End: 2024-02-18

## 2024-02-17 RX ORDER — OXYTOCIN 10 UNIT/ML
VIAL (ML) INJECTION
Qty: 30 | Refills: 0 | Status: DISCONTINUED | OUTPATIENT
Start: 2024-02-17 | End: 2024-02-17

## 2024-02-17 RX ORDER — TETANUS TOXOID, REDUCED DIPHTHERIA TOXOID AND ACELLULAR PERTUSSIS VACCINE, ADSORBED 5; 2.5; 8; 8; 2.5 [IU]/.5ML; [IU]/.5ML; UG/.5ML; UG/.5ML; UG/.5ML
0.5 SUSPENSION INTRAMUSCULAR ONCE
Refills: 0 | Status: DISCONTINUED | OUTPATIENT
Start: 2024-02-17 | End: 2024-02-18

## 2024-02-17 RX ORDER — BUPROPION HYDROCHLORIDE 150 MG/1
300 TABLET, EXTENDED RELEASE ORAL DAILY
Refills: 0 | Status: DISCONTINUED | OUTPATIENT
Start: 2024-02-17 | End: 2024-02-17

## 2024-02-17 RX ORDER — OXYCODONE HYDROCHLORIDE 5 MG/1
5 TABLET ORAL ONCE
Refills: 0 | Status: DISCONTINUED | OUTPATIENT
Start: 2024-02-17 | End: 2024-02-18

## 2024-02-17 RX ORDER — FENTANYL/BUPIVACAINE/NS/PF 2MCG/ML-.1
250 PLASTIC BAG, INJECTION (ML) INJECTION
Refills: 0 | Status: DISCONTINUED | OUTPATIENT
Start: 2024-02-17 | End: 2024-02-17

## 2024-02-17 RX ORDER — BUPROPION HYDROCHLORIDE 150 MG/1
150 TABLET, EXTENDED RELEASE ORAL DAILY
Refills: 0 | Status: DISCONTINUED | OUTPATIENT
Start: 2024-02-18 | End: 2024-02-18

## 2024-02-17 RX ORDER — HYDROCORTISONE 1 %
1 OINTMENT (GRAM) TOPICAL EVERY 6 HOURS
Refills: 0 | Status: DISCONTINUED | OUTPATIENT
Start: 2024-02-17 | End: 2024-02-18

## 2024-02-17 RX ORDER — OXYTOCIN 10 UNIT/ML
333.33 VIAL (ML) INJECTION
Qty: 20 | Refills: 0 | Status: DISCONTINUED | OUTPATIENT
Start: 2024-02-17 | End: 2024-02-17

## 2024-02-17 RX ORDER — OXYCODONE HYDROCHLORIDE 5 MG/1
5 TABLET ORAL
Refills: 0 | Status: DISCONTINUED | OUTPATIENT
Start: 2024-02-17 | End: 2024-02-18

## 2024-02-17 RX ORDER — DIBUCAINE 1 %
1 OINTMENT (GRAM) RECTAL EVERY 6 HOURS
Refills: 0 | Status: DISCONTINUED | OUTPATIENT
Start: 2024-02-17 | End: 2024-02-18

## 2024-02-17 RX ORDER — LANOLIN
1 OINTMENT (GRAM) TOPICAL EVERY 6 HOURS
Refills: 0 | Status: DISCONTINUED | OUTPATIENT
Start: 2024-02-17 | End: 2024-02-18

## 2024-02-17 RX ORDER — CHLORHEXIDINE GLUCONATE 213 G/1000ML
1 SOLUTION TOPICAL DAILY
Refills: 0 | Status: DISCONTINUED | OUTPATIENT
Start: 2024-02-17 | End: 2024-02-17

## 2024-02-17 RX ORDER — OXYTOCIN 10 UNIT/ML
41.67 VIAL (ML) INJECTION
Qty: 20 | Refills: 0 | Status: DISCONTINUED | OUTPATIENT
Start: 2024-02-17 | End: 2024-02-18

## 2024-02-17 RX ORDER — KETOROLAC TROMETHAMINE 30 MG/ML
30 SYRINGE (ML) INJECTION ONCE
Refills: 0 | Status: DISCONTINUED | OUTPATIENT
Start: 2024-02-17 | End: 2024-02-17

## 2024-02-17 RX ORDER — BENZOCAINE 10 %
1 GEL (GRAM) MUCOUS MEMBRANE EVERY 6 HOURS
Refills: 0 | Status: DISCONTINUED | OUTPATIENT
Start: 2024-02-17 | End: 2024-02-18

## 2024-02-17 RX ORDER — ALBUTEROL 90 UG/1
2 AEROSOL, METERED ORAL EVERY 6 HOURS
Refills: 0 | Status: DISCONTINUED | OUTPATIENT
Start: 2024-02-17 | End: 2024-02-18

## 2024-02-17 RX ORDER — SODIUM CHLORIDE 9 MG/ML
3 INJECTION INTRAMUSCULAR; INTRAVENOUS; SUBCUTANEOUS EVERY 8 HOURS
Refills: 0 | Status: DISCONTINUED | OUTPATIENT
Start: 2024-02-17 | End: 2024-02-18

## 2024-02-17 RX ORDER — PRAMOXINE HYDROCHLORIDE 150 MG/15G
1 AEROSOL, FOAM RECTAL EVERY 4 HOURS
Refills: 0 | Status: DISCONTINUED | OUTPATIENT
Start: 2024-02-17 | End: 2024-02-18

## 2024-02-17 RX ORDER — SIMETHICONE 80 MG/1
80 TABLET, CHEWABLE ORAL EVERY 4 HOURS
Refills: 0 | Status: DISCONTINUED | OUTPATIENT
Start: 2024-02-17 | End: 2024-02-18

## 2024-02-17 RX ORDER — SODIUM CHLORIDE 9 MG/ML
1000 INJECTION, SOLUTION INTRAVENOUS
Refills: 0 | Status: DISCONTINUED | OUTPATIENT
Start: 2024-02-17 | End: 2024-02-17

## 2024-02-17 RX ORDER — BUPROPION HYDROCHLORIDE 150 MG/1
100 TABLET, EXTENDED RELEASE ORAL
Refills: 0 | Status: DISCONTINUED | OUTPATIENT
Start: 2024-02-17 | End: 2024-02-17

## 2024-02-17 RX ORDER — DIPHENHYDRAMINE HCL 50 MG
25 CAPSULE ORAL EVERY 6 HOURS
Refills: 0 | Status: DISCONTINUED | OUTPATIENT
Start: 2024-02-17 | End: 2024-02-18

## 2024-02-17 RX ORDER — IBUPROFEN 200 MG
600 TABLET ORAL EVERY 6 HOURS
Refills: 0 | Status: COMPLETED | OUTPATIENT
Start: 2024-02-17 | End: 2025-01-15

## 2024-02-17 RX ORDER — MAGNESIUM HYDROXIDE 400 MG/1
30 TABLET, CHEWABLE ORAL
Refills: 0 | Status: DISCONTINUED | OUTPATIENT
Start: 2024-02-17 | End: 2024-02-18

## 2024-02-17 RX ORDER — AER TRAVELER 0.5 G/1
1 SOLUTION RECTAL; TOPICAL EVERY 4 HOURS
Refills: 0 | Status: DISCONTINUED | OUTPATIENT
Start: 2024-02-17 | End: 2024-02-18

## 2024-02-17 RX ADMIN — Medication 10 MILLIGRAM(S): at 22:17

## 2024-02-17 RX ADMIN — SODIUM CHLORIDE 125 MILLILITER(S): 9 INJECTION, SOLUTION INTRAVENOUS at 15:41

## 2024-02-17 RX ADMIN — Medication 975 MILLIGRAM(S): at 22:19

## 2024-02-17 RX ADMIN — SODIUM CHLORIDE 3 MILLILITER(S): 9 INJECTION INTRAMUSCULAR; INTRAVENOUS; SUBCUTANEOUS at 22:00

## 2024-02-17 RX ADMIN — Medication 2 MILLIUNIT(S)/MIN: at 08:59

## 2024-02-17 RX ADMIN — Medication 7.5 MILLIGRAM(S): at 13:35

## 2024-02-17 RX ADMIN — Medication 30 MILLIGRAM(S): at 18:41

## 2024-02-17 RX ADMIN — SODIUM CHLORIDE 125 MILLILITER(S): 9 INJECTION, SOLUTION INTRAVENOUS at 06:40

## 2024-02-17 NOTE — PRE-ANESTHESIA EVALUATION ADULT - NSANTHPMHFT_GEN_ALL_CORE
37 yo  at 37w0d presenting for a scheduled IOL for PEC w/o SF. She has had elevated BP's for a few months without medications, 24 hr urine on  resulted at 303. Patient also states that she has a significant history of anxiety and postpartum depression following her past deliveries, follows with a psychiatrist and is on a medication regimen. She endorses FM. Denies VB, LOF, ctx, HA, visual changes, RUQ pain, SOB, or LE edema.    ANTE: Spontaneous pregnancy. PEC w/o SF. NIPT and anatomy scan WNL. GCT passed. GBS negative. Denies thyroid disorders. EFW 2721 on  2900 by leopolds today.    OB: G1 - VTOP D&C . G2 -   2948g. G3 -   2863g c/b postpartum PEC w/ SF, requiring magnesium and postpartum depression.   GynHx: Denies fibroids, ovarian cysts, STI's, or abnormal PAP.  PMHx: Asthma, last used albuterol 1 yr ago, never hospitalized or intubated. Anxiety, Sjrogren's symptoms (negative on rheum testing).  PSurgHx: D&C  Medications: Wellbutrin 150 XR 6:00, Wellbutrin 100 SR 11:00, Busprione 7.5 07:00/14:00, 10mg @ bedtime. Albuterol.   ALL: NKDA

## 2024-02-17 NOTE — OB PROVIDER LABOR PROGRESS NOTE - ASSESSMENT
- Cat I FHT  - Dr. Bhatia updated  - Cont pit as tolerated  - Cont current management
FHT reviewed. Baseline 145, moderate variability, no accels, intermittent subtle late decels  TOCO: ctx 3min   cat II, overall reassuring with moderate variability     - maternal repositioning   -pitocin at 8mu  - continue to monitor closely 
- Cat I FHT  - Payne balloon in situ  - Pit at 4mu, cont as tolerated  - Cont current management
38 year old  at 37w0d here for IOL for PEC w/o SF. s/p cook balloon. s/p AROM. Epidural in situ.   - Plan to reposition to throne  - Titrate pitocin per protocol  - Continue to monitor.     
38 year old  at 37 weeks here for IOL for PEC w/o SF. Pt seen at bedside for cook balloon placement. Pt tolerated well. Taped to tension  - Plan to start pitocin. Titrate per protocol  - Continue to monitor  - Epidural prn      Rosa Maria Samuels PA-C

## 2024-02-17 NOTE — OB PROVIDER H&P - NS_OBGYNHISTORY_OBGYN_ALL_OB_FT
hx PEC with severe fx in previous pregnancy. PEC in current pregnancy.  hx mild asthma, inhaler used 1 year ago, cyst removed from R foot.   suspected sjogrens  anxiety and depression, on wellbutrin and buspiron

## 2024-02-17 NOTE — OB RN DELIVERY SUMMARY - NSSELHIDDEN_OBGYN_ALL_OB_FT
[NS_DeliveryAttending1_OBGYN_ALL_OB_FT:[id number]=],[NS_DeliveryAssist1_OBGYN_ALL_OB_FT:[id number]=],[NS_DeliveryRN_OBGYN_ALL_OB_FT:[id number]=]

## 2024-02-17 NOTE — OB PROVIDER H&P - PRO PRENATAL LABS ORI SOURCE HIV
patient, nursing, staff patient, nursing, staff patient, nursing, staff patient, nursing, staff patient, nursing, staff patient, nursing, staff patient, nursing, staff patient, nursing, staff nurse hard copy, drawn during this pregnancy

## 2024-02-17 NOTE — OB PROVIDER LABOR PROGRESS NOTE - NS_SUBJECTIVE/OBJECTIVE_OBGYN_ALL_OB_FT
Called by RN. Pt feeling significantly more rectal pressure. SVE fully/+1. Dr. Bhatia en route
FHT reviewed. Baseline 145, mod variability, +accels, -decels. Irreg ctx
Pt seen at bedside, feels rectal pressure with contractions. SVE 6-7/70/-1. Cervix R>L. Will reposition. FHT reviewed. Baseline 140, mod variability, +accels, nonrecurrent early decels. Ctx q2-3min
Pt seen at bedside due to early decelerations. Pt reports feeling comfortable with epidural.
Pt seen at bedside for cook balloon placement. Cook placed w/o difficulty. Pt tolerated placement well.

## 2024-02-17 NOTE — OB PROVIDER DELIVERY SUMMARY - NSSELHIDDEN_OBGYN_ALL_OB_FT
[NS_DeliveryAttending1_OBGYN_ALL_OB_FT:[id number]=],[NS_DeliveryAssist1_OBGYN_ALL_OB_FT:[id number]=],[NS_DeliveryRN_OBGYN_ALL_OB_FT:[id number]=]

## 2024-02-17 NOTE — OB PROVIDER DELIVERY SUMMARY - NSLOWPPHRISK_OBGYN_A_OB
No previous uterine incision/[place] Pregnancy/Less than or equal to 4 previous vaginal births/No known bleeding disorder

## 2024-02-17 NOTE — OB PROVIDER H&P - NSHPPHYSICALEXAM_GEN_ALL_CORE
BP: 144/84 HR: 87  Gen: NAD, A&Ox3  Abd: non-tender, gravid  LE: no swelling or pitting edema  Skin: no excoriations or rashes  Pulm: no increased WOB  SVE: 1-2/50/-3    FHT: Cat 1,  bpm, moderate variability, + accels, - decels.  Caroline: Cathie q5 minutes on toco  TAUS: Cephalic, posterior placenta.

## 2024-02-17 NOTE — OB PROVIDER DELIVERY SUMMARY - NSPROVIDERDELIVERYNOTE_OBGYN_ALL_OB_FT
Pt 34yo  at 37+0 presents for IOL for preeclampsia without severe features on 2024. Labor was induced with cervical medina balloon and pitocin started at 0900. Pt underwent AROM at 1140 for clear fluid and received epidural. She progressed to fully dilated, pushed and delivered a vigorous  on second push at 1704 on . Placenta delivered spontaneously intact at 1707.  with Apgars 9/9. Perineum, cervix, vagina inspected and found to be intact. EBL 100cc. Excellent hemostasis. Mother and infant in stable conditions. Dr. Bhatia and Dr. Parmar present throughout.

## 2024-02-17 NOTE — OB RN DELIVERY SUMMARY - NS_SEPSISRSKCALC_OBGYN_ALL_OB_FT
EOS calculated successfully. EOS Risk Factor: 0.5/1000 live births ([facility] national incidence); GA=37w;Temp=98.6; ROM=5.483; GBS='Negative'; Antibiotics='No antibiotics or any antibiotics < 2 hrs prior to birth'

## 2024-02-17 NOTE — OB PROVIDER DELIVERY SUMMARY - NSLACERATION_OBGYN_ALL_OB
Spoke with pt to see if her issues with dryness have resolved. Pt has turned the temp on her hose to 60 degrees and will increase it. Pt states that she has tried different masks and that has not helped. Pt does have an appointment to go in to her DME for a mask refit  . P min was decreased to 12 cmH2O. Pt to call if dryness does not decrease. No

## 2024-02-17 NOTE — OB PROVIDER H&P - NSLOWPPHRISK_OBGYN_A_OB
No previous uterine incision/[place] Pregnancy/Less than or equal to 4 previous vaginal births/No known bleeding disorder

## 2024-02-17 NOTE — OB PROVIDER H&P - ASSESSMENT
39 yo  at 37w0d presenting for a scheduled IOL for PEC w/o SF.  - IV fluids, NPO  - Admit to L&D  - Continuous FHT and toco monitoring. Currently reactive and reassuring.  - Prenatals reviewed. GBS negative. No indication for antibiotic prophylaxis.  - PEC w/o SF: full labs pending, monitor for elevated BP's and for toxic complaints  - Asthma: albuterol ordered  - Anxiety: continue home regimen, consider social work postpartum  - Plan: IOL with medina balloon and pitocin. Epidural placement upon pt request.   - Risks, benefits, and alternatives discussed. Consents obtained.    D/W Dr. Sriram Angela, PA-C

## 2024-02-17 NOTE — OB RN PATIENT PROFILE - NS_SUPPORTPERSONNAME_OBGYN_ALL_OB_FT
Herson Ferrari [phone] Herson Ferrari [phone] Herson Ferrari [phone] Herson Ferrari [phone]

## 2024-02-17 NOTE — OB PROVIDER LABOR PROGRESS NOTE - NS_OBIHIFHRDETAILS_OBGYN_ALL_OB_FT
Cat 1 tracing  AROM clear
Cat I tracing. Baseline 140bpm. Moderate variability. +accels, no decels noted.
Cat I tracing. Baseline 140bpm. Moderate variability. +accels, recurrent early decelerations.

## 2024-02-17 NOTE — OB PROVIDER H&P - HISTORY OF PRESENT ILLNESS
39 yo  at 37w0d presenting for a scheduled IOL for PEC w/o SF. She has had elevated BP's for a few months without medications, 24 hr urine on  resulted at 303. Patient also states that she has a significant history of anxiety, follows with a psychiatrist and is on a medication regimen. She endorses FM. Denies VB, LOF, ctx, HA, visual changes, RUQ pain, SOB, or LE edema.    ANTE: Spontaneous pregnancy. PEC w/o SF. NIPT and anatomy scan WNL. GCT passed. GBS negative. Denies thyroid disorders. EFW 2721 on  2900 by leopolds today.    OB: G1 - VTOP D&C . G2 -   2948g. G3 -   2863g c/b postpartum PEC w/ SF, requiring magnesium and postpartum depression.   GynHx: Denies fibroids, ovarian cysts, STI's, or abnormal PAP.  PMHx: Asthma, last used albuterol 1 yr ago, never hospitalized or intubated. Anxiety, Sjrogren's symptoms (negative on rheum testing).  PSurgHx: D&C  Medications: Wellbutrin 150 XR 6:00, Wellbutrin 100 SR 11:00, Busprione 7.5 07:00/14:00, 10mg @ bedtime. Albuterol.   ALL: NKDA 37 yo  at 37w0d presenting for a scheduled IOL for PEC w/o SF. She has had elevated BP's for a few months without medications, 24 hr urine on  resulted at 303. Patient also states that she has a significant history of anxiety and postpartum depression following her past deliveries, follows with a psychiatrist and is on a medication regimen. She endorses FM. Denies VB, LOF, ctx, HA, visual changes, RUQ pain, SOB, or LE edema.    ANTE: Spontaneous pregnancy. PEC w/o SF. NIPT and anatomy scan WNL. GCT passed. GBS negative. Denies thyroid disorders. EFW 2721 on  2900 by leopolds today.    OB: G1 - VTOP D&C . G2 -   2948g. G3 -   2863g c/b postpartum PEC w/ SF, requiring magnesium and postpartum depression.   GynHx: Denies fibroids, ovarian cysts, STI's, or abnormal PAP.  PMHx: Asthma, last used albuterol 1 yr ago, never hospitalized or intubated. Anxiety, Sjrogren's symptoms (negative on rheum testing).  PSurgHx: D&C  Medications: Wellbutrin 150 XR 6:00, Wellbutrin 100 SR 11:00, Busprione 7.5 07:00/14:00, 10mg @ bedtime. Albuterol.   ALL: NKDA

## 2024-02-18 ENCOUNTER — TRANSCRIPTION ENCOUNTER (OUTPATIENT)
Age: 39
End: 2024-02-18

## 2024-02-18 VITALS
SYSTOLIC BLOOD PRESSURE: 125 MMHG | HEART RATE: 96 BPM | OXYGEN SATURATION: 98 % | RESPIRATION RATE: 17 BRPM | DIASTOLIC BLOOD PRESSURE: 81 MMHG | TEMPERATURE: 99 F

## 2024-02-18 PROCEDURE — 86780 TREPONEMA PALLIDUM: CPT

## 2024-02-18 PROCEDURE — 36415 COLL VENOUS BLD VENIPUNCTURE: CPT

## 2024-02-18 PROCEDURE — 83615 LACTATE (LD) (LDH) ENZYME: CPT

## 2024-02-18 PROCEDURE — 86900 BLOOD TYPING SEROLOGIC ABO: CPT

## 2024-02-18 PROCEDURE — 85730 THROMBOPLASTIN TIME PARTIAL: CPT

## 2024-02-18 PROCEDURE — 86901 BLOOD TYPING SEROLOGIC RH(D): CPT

## 2024-02-18 PROCEDURE — 84550 ASSAY OF BLOOD/URIC ACID: CPT

## 2024-02-18 PROCEDURE — 80053 COMPREHEN METABOLIC PANEL: CPT

## 2024-02-18 PROCEDURE — 84156 ASSAY OF PROTEIN URINE: CPT

## 2024-02-18 PROCEDURE — 86850 RBC ANTIBODY SCREEN: CPT

## 2024-02-18 PROCEDURE — 85610 PROTHROMBIN TIME: CPT

## 2024-02-18 PROCEDURE — 82570 ASSAY OF URINE CREATININE: CPT

## 2024-02-18 PROCEDURE — 59050 FETAL MONITOR W/REPORT: CPT

## 2024-02-18 PROCEDURE — 85025 COMPLETE CBC W/AUTO DIFF WBC: CPT

## 2024-02-18 PROCEDURE — 85384 FIBRINOGEN ACTIVITY: CPT

## 2024-02-18 RX ORDER — ALBUTEROL 90 UG/1
1 AEROSOL, METERED ORAL
Qty: 0 | Refills: 0 | DISCHARGE

## 2024-02-18 RX ORDER — IBUPROFEN 200 MG
600 TABLET ORAL EVERY 6 HOURS
Refills: 0 | Status: DISCONTINUED | OUTPATIENT
Start: 2024-02-18 | End: 2024-02-18

## 2024-02-18 RX ADMIN — BUPROPION HYDROCHLORIDE 150 MILLIGRAM(S): 150 TABLET, EXTENDED RELEASE ORAL at 06:38

## 2024-02-18 RX ADMIN — Medication 600 MILLIGRAM(S): at 02:19

## 2024-02-18 RX ADMIN — Medication 600 MILLIGRAM(S): at 08:34

## 2024-02-18 RX ADMIN — SODIUM CHLORIDE 3 MILLILITER(S): 9 INJECTION INTRAMUSCULAR; INTRAVENOUS; SUBCUTANEOUS at 06:00

## 2024-02-18 RX ADMIN — Medication 7.5 MILLIGRAM(S): at 07:15

## 2024-02-18 RX ADMIN — Medication 600 MILLIGRAM(S): at 15:26

## 2024-02-18 NOTE — DISCHARGE NOTE OB - MEDICATION SUMMARY - MEDICATIONS TO TAKE
I will START or STAY ON the medications listed below when I get home from the hospital:    ibuprofen 600 mg oral tablet  -- 1 tab(s) by mouth every 6 hours  -- Indication: For Pain    acetaminophen 325 mg oral tablet  -- 3 tab(s) by mouth every 6 hours, As Needed  -- Indication: For Pain    ZyrTEC 5 mg oral tablet  -- 1 tab(s) by mouth once a day  -- Indication: For Home medication    Prenatal Multivitamins oral tablet  -- 1  by mouth once a day  -- Indication: For Home medication    Wellbutrin  mg/24 hours oral tablet, extended release  -- 1 tab(s) by mouth every 24 hours  -- Indication: For Home medication

## 2024-02-18 NOTE — DISCHARGE NOTE OB - MATERIALS PROVIDED
Vaccinations/[facility] [place] Screening Program/[place]  Immunization Record/Breastfeeding Log/Bottle Feeding Log/Breastfeeding Mother’s Support Group Information/Guide to Postpartum Care/[facility] Hearing Screen Program/Back To Sleep Handout/Shaken Baby Prevention Handout/Breastfeeding Guide and Packet/Birth Certificate Instructions/Discharge Medication Information for Patients and Families Pocket Guide/MMR Vaccination (VIS Pub Date: 2012)/Tdap Vaccination (VIS Pub Date: 2012)

## 2024-02-18 NOTE — DISCHARGE NOTE OB - CARE PLAN
Principal Discharge DX:	Postpartum state  Assessment and plan of treatment:	Please follow-up with your OB doctor within 1-2 weeks. You can resume a regular diet at home and you should continue your prenatal vitamins as directed. You can take Motrin 600mg by mouth every 6 hours for pain as needed.    Please place nothing in the vagina for 6 weeks (no tampons, no sex, no douching, no baths, no hot tubs, no swimming pools, etc). If you have severe headaches and/or vision changes, heavy bleeding, chest pain, or shortness of breath, please call your provider or go to the nearest ED. Call your OB with any signs of symptoms of infection including fever > 100.4 degrees, severe pain, malodorous vaginal discharge or heavy bleeding requiring more than 1-2 pads/hour.  Secondary Diagnosis:	Preeclampsia, third trimester  Assessment and plan of treatment:	Please follow up with your OB within 1-2 weeks for a blood pressure check. Check blood pressures at home 3x a day. If your blood pressure is ever greater than 160/110, you develop a headache not relieved by tylenol, visual disturbances, or right upper abdominal pain, call your doctor or the hospital, or go to your nearest emergency room right away.   1

## 2024-02-18 NOTE — DISCHARGE NOTE OB - PATIENT PORTAL LINK FT
You can access the FollowMyHealth Patient Portal offered by [facility] by registering at the following website: [email or web address]. By joining [facility]’s FollowMyHealth portal, you will also be able to view your health information using other applications (apps) compatible with our system.

## 2024-02-18 NOTE — DISCHARGE NOTE OB - NSDCCPGOAL_GEN_ALL_CORE_FT
Acne Treatment Daily Routine    Take Doxycycline one capsule by mouth twice a day *Take with food*   Take Spironolactone one tablet by mouth once a day     AM:  Step 1. Wash face gently with CeraVe .  Step 2. Wait a couple minutes.  Step 3. Use an oil-free moisturizer with sunscreen (such as Cetaphil Cream(in the tub)     PM:  Step 1. Wash face gently with CeraVe .  Step 2. Wait a couple minutes until skin is COMPLETELY DRY  Step 3. Tretinoin  See retinoid instructions provided for more details.   Step 4. Apply an oil-free moisturizer  (such as Cetaphil Cream(in the tub)       You were prescribed Tretinoin, a topical retinoid.  Follow these instructions for the best chance for successful treatment.    How to use topical retinoid medications:  Topical retinoid-containing medications can initially cause skin irritation (redness, flaking, burning) and many individuals. Proper use of the medication, as well as other strategies, can reduce the risk of irritation and minimize skin dryness.    It is essential to start by using the medication twice a week, and slowly increase the frequency of use to daily or every other day use. Increasing the frequency of by 1 additional day per week is a conservative approach, as the presentation of skin irritation can be delayed by several days after application of the medication.  Ideally, medication should ultimately be applied once daily.    Use the medication at night before bedtime.    How to apply:  1. Wash the skin with cool or lukewarm water. Do not use hot water.  2. Cleanse skin with the cleanser recommended by your doctor.  Pat skin dry.   3. Wait 10 minutes before applying medication.  4. Apply a pea-sized amount of the medication to the entire face by dotting it around the face and then gently connecting the dots with your fingertips. Avoid application near the eyes and lips as this can cause irritation and scaling. It is not necessary to rub it in vigorously.  5. If you are  experiencing mild irritation or dryness, try the following strategies to minimize irritation:   -Decreased the frequency of use   -Apply a non-comedogenic moisturizer after application of the prescription  Retinoid.  6. Do not use astringents and abrasive products.  7. If you experience severe irritation, discontinue use and wait until it completely resolves to restart it. Consult your doctor if you are uncertain of whether it is safe to restart the medication.    Important information regarding topical retinoids:  Acne can often flare during the first month of using some topical retinoid medications. This is common and an usually resolves within 3-4 weeks of continued use.    Topical retinoids can make the skin more sensitive to the sun. It is recommended to use a non-comedogenic sunscreen during the day.    Topical retinoid medications are not recommended for use during pregnancy. If you become pregnant stop the medication immediately.           Happy and healthy mom and baby!

## 2024-02-18 NOTE — PROGRESS NOTE ADULT - SUBJECTIVE AND OBJECTIVE BOX
Patient evaluated at bedside this morning, resting comfortable in bed, no acute events overnight. She reports pain is well-controlled.  She denies headache, dizziness, changes in vision, chest pain, palpitations, shortness of breath, RUQ pain, nausea, vomiting, fever, chills, heavy vaginal bleeding.  She has been ambulating without assistance, voiding spontaneously, tolerating food.      Physical Exam:  T(C): 37.3 (02-18-24 @ 07:20), Max: 37.3 (02-18-24 @ 07:20)  HR: 96 (02-18-24 @ 07:20) (96 - 96)  BP: 125/81 (02-18-24 @ 07:20) (125/81 - 125/81)  RR: 17 (02-18-24 @ 07:20) (17 - 17)  SpO2: 98% (02-18-24 @ 07:20) (98% - 98%)    Gen: no apparent distress  Pulm: no increased work of breathing  Abd: soft, nontender, nondistended, no rebound or guarding, uterus firm  Extremities: trace pedal edema bilaterally, no calf tenderness bilaterally                          10.9   8.86  )-----------( 422      ( 17 Feb 2024 07:05 )             31.1     02-17    138  |  106  |  10  ----------------------------<  105<H>  4.2   |  21<L>  |  0.79    Ca    9.2      17 Feb 2024 07:05    TPro  6.5  /  Alb  3.4  /  TBili  0.3  /  DBili  x   /  AST  15  /  ALT  9<L>  /  AlkPhos  175<H>  02-17    acetaminophen     Tablet .. 975 milliGRAM(s) Oral <User Schedule>  albuterol    90 MICROgram(s) HFA Inhaler 2 Puff(s) Inhalation every 6 hours PRN  benzocaine 20%/menthol 0.5% Spray 1 Spray(s) Topical every 6 hours PRN  buPROPion XL (24-Hour) . 150 milliGRAM(s) Oral daily  dibucaine 1% Ointment 1 Application(s) Topical every 6 hours PRN  diphenhydrAMINE 25 milliGRAM(s) Oral every 6 hours PRN  diphtheria/tetanus/pertussis (acellular) Vaccine (Adacel) 0.5 milliLiter(s) IntraMuscular once  hydrocortisone 1% Cream 1 Application(s) Topical every 6 hours PRN  ibuprofen  Tablet. 600 milliGRAM(s) Oral every 6 hours  lanolin Ointment 1 Application(s) Topical every 6 hours PRN  magnesium hydroxide Suspension 30 milliLiter(s) Oral two times a day PRN  oxyCODONE    IR 5 milliGRAM(s) Oral once PRN  oxyCODONE    IR 5 milliGRAM(s) Oral every 3 hours PRN  oxytocin Infusion 41.667 milliUNIT(s)/Min IV Continuous <Continuous>  pramoxine 1%/zinc 5% Cream 1 Application(s) Topical every 4 hours PRN  prenatal multivitamin 1 Tablet(s) Oral daily  simethicone 80 milliGRAM(s) Chew every 4 hours PRN  sodium chloride 0.9% lock flush 3 milliLiter(s) IV Push every 8 hours  witch hazel Pads 1 Application(s) Topical every 4 hours PRN

## 2024-02-18 NOTE — PROGRESS NOTE ADULT - ASSESSMENT
A/P  38y s/p  c/b preeclampsia without severe features, PPD# 1, stable, meeting postpartum milestones   - PEC: normotensive to mild range BPs; denies toxic symptoms; continue to monitor  - Mood disorder: controlled; continue Wellbutrin and Buspar as per home regimen  - Pain: well controlled on analgesics as above  - GI: Tolerating regular diet  - : urinating without difficulty/pain  - DVT prophylaxis: ambulating frequently  - Dispo: PPD 2, unless otherwise specified

## 2024-02-18 NOTE — DISCHARGE NOTE OB - HOSPITAL COURSE
Pt is a 38yF s/p  c/o PEC w/o SF.  Please see delivery note for details.  During postpartum course patient's vitals were stable, vaginal bleeding appropriate, and pain well controlled.  On day of discharge patient was ambulating, pt had adequate oral intake, and was voiding freely.  Discharge instructions and precautions were given.  Will return to clinic in 1-2weeks for postpartum visit and BP check. Continue home medications.

## 2024-02-18 NOTE — DISCHARGE NOTE OB - PLAN OF CARE
Please follow up with your OB within 1-2 weeks for a blood pressure check. Check blood pressures at home 3x a day. If your blood pressure is ever greater than 160/110, you develop a headache not relieved by tylenol, visual disturbances, or right upper abdominal pain, call your doctor or the hospital, or go to your nearest emergency room right away. Please follow-up with your OB doctor within 1-2 weeks. You can resume a regular diet at home and you should continue your prenatal vitamins as directed. You can take Motrin 600mg by mouth every 6 hours for pain as needed.    Please place nothing in the vagina for 6 weeks (no tampons, no sex, no douching, no baths, no hot tubs, no swimming pools, etc). If you have severe headaches and/or vision changes, heavy bleeding, chest pain, or shortness of breath, please call your provider or go to the nearest ED. Call your OB with any signs of symptoms of infection including fever > 100.4 degrees, severe pain, malodorous vaginal discharge or heavy bleeding requiring more than 1-2 pads/hour.

## 2024-02-18 NOTE — DISCHARGE NOTE OB - CARE PROVIDER_API CALL
Tena Bhatia  Obstetrics and Gynecology  [address] [place] [id number]  Phone: [phone]  Fax: [phone]  Follow Up Time:

## 2024-02-18 NOTE — DISCHARGE NOTE OB - NS MD DC FALL RISK RISK
For information on Fall & Injury Prevention, visit: [email or web address] OR  [email or web address] OR  https://www.cdc.gov/steadi/patient.html

## 2024-02-20 DIAGNOSIS — Z79.82 LONG TERM (CURRENT) USE OF ASPIRIN: ICD-10-CM

## 2024-02-20 DIAGNOSIS — Z79.899 OTHER LONG TERM (CURRENT) DRUG THERAPY: ICD-10-CM

## 2024-02-20 DIAGNOSIS — F39 UNSPECIFIED MOOD [AFFECTIVE] DISORDER: ICD-10-CM

## 2024-02-20 DIAGNOSIS — Z3A.37 37 WEEKS GESTATION OF PREGNANCY: ICD-10-CM

## 2024-02-20 DIAGNOSIS — J45.909 UNSPECIFIED ASTHMA, UNCOMPLICATED: ICD-10-CM

## 2024-02-20 DIAGNOSIS — F41.9 ANXIETY DISORDER, UNSPECIFIED: ICD-10-CM

## 2024-12-04 NOTE — PATIENT PROFILE OB - AS SC BRADEN ACTIVITY
----- Message from MANE Schmidt sent at 12/4/2024  8:49 AM CST -----  Ron Wright, this patient needs CT Urogram with pre/post IVF. I placed the order for CT Urogram. Can you please make sure therapy plan is in place.     Thanks!  Margot  
IV Hydration therapy plan in place.   
(4) walks frequently

## 2025-05-13 NOTE — ED PROVIDER NOTE - IV ALTEPLASE DOOR HIDDEN
Total vials prepared: 2. First vial contains Pollens with 10 doses and second vial contains Mites, Molds, Epithelia, and Insects (M,M,Epi,I) with 10 doses.  Allergy extract was prepared according to written prescription by provider.  Provider onsite during allergy extract preparation.  Patient vials labeled with name, date of birth, vial number, Pollen or M M Epi I, and expiration date.  Injections are given weekly according to provider orders and injections are built according to patient tolerance to achieve a goal of maintenance.  See media scan for Prescription Treatment Set. See allergy flowsheets for injection documentation from each visit.   show
